# Patient Record
Sex: FEMALE | Race: WHITE | Employment: OTHER | URBAN - METROPOLITAN AREA
[De-identification: names, ages, dates, MRNs, and addresses within clinical notes are randomized per-mention and may not be internally consistent; named-entity substitution may affect disease eponyms.]

---

## 2019-05-31 ENCOUNTER — APPOINTMENT (OUTPATIENT)
Dept: GENERAL RADIOLOGY | Age: 34
End: 2019-05-31
Payer: COMMERCIAL

## 2019-05-31 ENCOUNTER — HOSPITAL ENCOUNTER (EMERGENCY)
Age: 34
Discharge: HOME | End: 2019-05-31
Payer: COMMERCIAL

## 2019-05-31 VITALS
DIASTOLIC BLOOD PRESSURE: 63 MMHG | RESPIRATION RATE: 17 BRPM | HEART RATE: 86 BPM | HEIGHT: 66 IN | WEIGHT: 125 LBS | OXYGEN SATURATION: 99 % | SYSTOLIC BLOOD PRESSURE: 105 MMHG | TEMPERATURE: 97.8 F | BODY MASS INDEX: 20.09 KG/M2

## 2019-05-31 DIAGNOSIS — S52.601A CLOSED FRACTURE OF DISTAL END OF RIGHT ULNA, UNSPECIFIED FRACTURE MORPHOLOGY, INITIAL ENCOUNTER: Primary | ICD-10-CM

## 2019-05-31 PROCEDURE — 73110 X-RAY EXAM OF WRIST: CPT

## 2019-05-31 PROCEDURE — 4500000023 HC ED LEVEL 3 PROCEDURE

## 2019-05-31 PROCEDURE — 6370000000 HC RX 637 (ALT 250 FOR IP): Performed by: NURSE PRACTITIONER

## 2019-05-31 PROCEDURE — 99283 EMERGENCY DEPT VISIT LOW MDM: CPT

## 2019-05-31 RX ORDER — HYDROCODONE BITARTRATE AND ACETAMINOPHEN 5; 325 MG/1; MG/1
1 TABLET ORAL ONCE
Status: DISCONTINUED | OUTPATIENT
Start: 2019-05-31 | End: 2019-05-31

## 2019-05-31 RX ORDER — NAPROXEN 500 MG/1
500 TABLET ORAL 2 TIMES DAILY
Qty: 60 TABLET | Refills: 0 | Status: SHIPPED | OUTPATIENT
Start: 2019-05-31 | End: 2021-06-16

## 2019-05-31 RX ORDER — HYDROCODONE BITARTRATE AND ACETAMINOPHEN 5; 325 MG/1; MG/1
1 TABLET ORAL EVERY 6 HOURS PRN
Qty: 10 TABLET | Refills: 0 | Status: SHIPPED | OUTPATIENT
Start: 2019-05-31 | End: 2019-06-03

## 2019-05-31 RX ORDER — NAPROXEN 250 MG/1
500 TABLET ORAL ONCE
Status: COMPLETED | OUTPATIENT
Start: 2019-05-31 | End: 2019-05-31

## 2019-05-31 RX ADMIN — NAPROXEN 500 MG: 250 TABLET ORAL at 07:55

## 2019-05-31 ASSESSMENT — PAIN DESCRIPTION - ONSET: ONSET: ON-GOING

## 2019-05-31 ASSESSMENT — PAIN DESCRIPTION - FREQUENCY: FREQUENCY: CONTINUOUS

## 2019-05-31 ASSESSMENT — PAIN SCALES - GENERAL
PAINLEVEL_OUTOF10: 9
PAINLEVEL_OUTOF10: 7
PAINLEVEL_OUTOF10: 7

## 2019-05-31 ASSESSMENT — PAIN DESCRIPTION - PAIN TYPE: TYPE: ACUTE PAIN

## 2019-05-31 ASSESSMENT — PAIN DESCRIPTION - ORIENTATION: ORIENTATION: RIGHT

## 2019-05-31 ASSESSMENT — PAIN - FUNCTIONAL ASSESSMENT: PAIN_FUNCTIONAL_ASSESSMENT: ACTIVITIES ARE NOT PREVENTED

## 2019-05-31 ASSESSMENT — PAIN DESCRIPTION - LOCATION: LOCATION: ARM

## 2019-05-31 ASSESSMENT — PAIN DESCRIPTION - PROGRESSION: CLINICAL_PROGRESSION: GRADUALLY WORSENING

## 2019-05-31 ASSESSMENT — PAIN DESCRIPTION - DESCRIPTORS: DESCRIPTORS: THROBBING

## 2019-05-31 NOTE — ED TRIAGE NOTES
pt presents ambulatory to ED for poss broken R arm. she tripped and fell up the stairs right now. tearful in pain. pain 9/10. Pt refused norco, requesting for non-narcotic.  Will inform provider

## 2019-05-31 NOTE — ED PROVIDER NOTES
Tobacco use disorder     Unequal leg length (acquired)     Urinary tract infection, site not specified      Past Surgical History:   Procedure Laterality Date    COLPOSCOPY  2/26/2013,8/2012    hsil and hpv+       CURRENT MEDICATIONS  (may include discharge medications prescribed in the ED)  Current Outpatient Rx   Medication Sig Dispense Refill    naproxen (NAPROSYN) 500 MG tablet Take 1 tablet by mouth 2 times daily 60 tablet 0    HYDROcodone-acetaminophen (NORCO) 5-325 MG per tablet Take 1 tablet by mouth every 6 hours as needed for Pain for up to 3 days. Intended supply: 3 days. Take lowest dose possible to manage pain 10 tablet 0    drospirenone-ethinyl estradiol (OCELLA) 3-0.03 MG TABS Take 1 tablet by mouth daily 28 tablet 11       ALLERGIES    No Known Allergies    SOCIAL & FAMILY HISTORY    Social History     Socioeconomic History    Marital status:      Spouse name: None    Number of children: None    Years of education: None    Highest education level: None   Occupational History    None   Social Needs    Financial resource strain: None    Food insecurity:     Worry: None     Inability: None    Transportation needs:     Medical: None     Non-medical: None   Tobacco Use    Smoking status: Current Every Day Smoker     Packs/day: 0.50     Years: 14.00     Pack years: 7.00    Smokeless tobacco: Never Used    Tobacco comment: encouraged to quit/6/6/13, cessation 1/15/14/cessasion 11/15   Substance and Sexual Activity    Alcohol use:  Yes     Alcohol/week: 0.0 oz     Comment: occasionally    Drug use: No    Sexual activity: Yes     Partners: Male   Lifestyle    Physical activity:     Days per week: None     Minutes per session: None    Stress: None   Relationships    Social connections:     Talks on phone: None     Gets together: None     Attends Worship service: None     Active member of club or organization: None     Attends meetings of clubs or organizations: None placement. ED COURSE & MEDICAL DECISION MAKING   See chart for medications given during emergency department course. Differential diagnosis: includes but not limited to Arterial Injury/Ischemia, Fracture, Dislocation, Infection, Compartment Syndrome, Neurologic Deficit/Injury. No evidence of neurovascular injury on exam.  Plain films as above. The patient was instructed to follow up as an outpatient in 3 days with orthopedics. The patient was instructed to return to the ED immediately for any new or worsening symptoms. The patient verbalized understanding. I have evaluated this patient. My supervising physician was available for consultation. FINAL IMPRESSION    1.  Closed fracture of distal end of right ulna, unspecified fracture morphology, initial encounter        PLAN  Discharge with outpatient follow-up and discharge instructions (see EMR)    (Please note that this note was completed with a voice recognition program.  Every attempt was made to edit the dictations, but inevitably there remain words that are mis-transcribed.)        AMBROSE Tipton - CNP  05/31/19 0398

## 2019-06-03 ENCOUNTER — OFFICE VISIT (OUTPATIENT)
Dept: ORTHOPEDIC SURGERY | Age: 34
End: 2019-06-03
Payer: COMMERCIAL

## 2019-06-03 VITALS
DIASTOLIC BLOOD PRESSURE: 76 MMHG | HEIGHT: 66 IN | WEIGHT: 125 LBS | BODY MASS INDEX: 20.09 KG/M2 | HEART RATE: 91 BPM | TEMPERATURE: 98.4 F | SYSTOLIC BLOOD PRESSURE: 115 MMHG

## 2019-06-03 VITALS — WEIGHT: 125 LBS | BODY MASS INDEX: 20.09 KG/M2 | HEIGHT: 66 IN | RESPIRATION RATE: 16 BRPM

## 2019-06-03 DIAGNOSIS — S52.231A CLOSED DISPLACED OBLIQUE FRACTURE OF SHAFT OF RIGHT ULNA, INITIAL ENCOUNTER: Primary | ICD-10-CM

## 2019-06-03 DIAGNOSIS — S52.201A CLOSED FRACTURE OF SHAFT OF RIGHT ULNA, UNSPECIFIED FRACTURE MORPHOLOGY, INITIAL ENCOUNTER: Primary | ICD-10-CM

## 2019-06-03 PROCEDURE — G8420 CALC BMI NORM PARAMETERS: HCPCS | Performed by: ORTHOPAEDIC SURGERY

## 2019-06-03 PROCEDURE — G8427 DOCREV CUR MEDS BY ELIG CLIN: HCPCS | Performed by: ORTHOPAEDIC SURGERY

## 2019-06-03 PROCEDURE — L3660 SO 8 AB RSTR CAN/WEB PRE OTS: HCPCS | Performed by: PHYSICIAN ASSISTANT

## 2019-06-03 PROCEDURE — 99243 OFF/OP CNSLTJ NEW/EST LOW 30: CPT | Performed by: ORTHOPAEDIC SURGERY

## 2019-06-03 NOTE — LETTER
Left.  FDS, FDP, and Common Extensor tendon function is intact to each digit. FPL & EPL tendon function is intact to the thumb. Wrist range of motion is limited by pain and not able to be evaluated due to the associated injury on the Right, normal on the Left  Sensation is subjectively normal in the Median Innervated Digits and Ulnar Innervated Digits, other digits are normally sensate bilaterally   Vascular examination reveals normal and good capillary refill bilaterally  Swelling is moderate in the wrist & digits on the Right, normal on the Left. Maximal pain is elicited with palpation of the Ulnar aspect of the wrist.  The distal ulna/ulnar styloid is moderately tender to palpation. The radial wrist and forearm are minimally tender  There is not clinical evidence of skeletal deformity or mal-alignment on the Right, normal on the Left. There is no evidence of gross joint instability, excluding the immediate zone of injury, bilaterally. Muscular strength is clinically appropriate bilaterally, limited by pain in the injured extremity. Radiographic Evaluation:  Radiographs are reviewed  today (3 views of the right wrist). They demonstrate evidence of an acute fracture of the ULNAR SHAFT with no comminution, displaced, mild loss of lenght. The distal radius and remaining carpus does not demonstrate evidence of a fracture. There is not evidence of other injury or bony fracture. Impression:  Ms. Murtaza Downey has sustained recent Ulnar Shaft fracture displaced and presents requesting further treatment. Plan:  I have discussed with Ms. Murtaza Downey the various treatment options for treatment of right wrist fracture. We discussed the options of Closed treatment in situ, attempted closed reduction & cast immobilization, and Open Reduction & Internal Fixation of the fracture. I have explained the pre-, radha- ane post-operative considerations to her. She is specifically instructed to contact the office between now & her scheduled appointment if she has concerns related to the cast or the underlying fracture. She is welcome to call for an appointment sooner if she has any additional concerns or questions. If you have questions, please do not hesitate to call me. I look forward to following Selena Navarro along with you.     Sincerely,        Babatunde Booth MD

## 2019-06-03 NOTE — LETTER
[facility]  Surgery Scheduling Form:      DEMOGRAPHICS:                                                                                                              .    Patient Name:  Perico Michele  Patient :  1985   Patient SS#:      Patient Phone:  [phone] (home)     Patient Address:  [place] [id number]    PCP:  TIMOTHY Rueda MD  Insurance:    Payor/Plan Subscr  Sex Relation Sub. Ins. ID Effective Group Num   1. [employer] - * DARIA ANDREWS* 1985 Female Self [id number] 1/1/15 [facility] [address]   DIAGNOSIS & PROCEDURE:                                                                                            .  Diagnosis:   right Ulnar Shaft Fracture   S52.601A  Operation:  Open Reduction & Internal Fixation Right Ulnar Shaft Fracture   [address]  Location:  [facility] [place]  Surgeon:  Betzaida Carrasquillo    SCHEDULING INFORMATION:                                                                                         .  Surgeon's Scheduling Instruction:  within 7 days  Requested Date:    OR Time:  10:10 Patient Arrival Time:  8:45OR Time Required:  50  Minutes  Anesthesia:  General  Equipment:  KENIA Locking Small Fragment Tray, TPS  Mini C-Arm:  Yes   Standard C-Arm:  No  Status:  outpatient  PAT Required:  Yes  Comments:                        Belle White MD  6/3/19     BILLING INFORMATION:                                                                                                    .    Procedure:       CPT Code Modifier

## 2019-06-03 NOTE — LETTER
CONSENT TO OPERATION  AND/OR OTHER PROCEDURE(S)          PATIENT : Bianka Roland   YOB: 1985    DATE : 6/3/19          1. I request and consent that Dr. Eloisa Umana. James Reeves,  and/or his associates or assistants perform an operation and/or procedures on the above patient at  [place] [address], to treat the condition(s) which appear indicated by the diagnostic studies already performed. I have been told that in general terms the nature, purpose and reasonable expectations of the operation and/or procedure(s) are:      Open Reduction & Internal Fixation Right Ulnar Shaft Fracture      2. It has been explained to me by the informing physician that during the course of the operation and/or procedure(s) unforeseen conditions may be revealed that necessitate an extension of the original operation and/or procedure(s) or different operation and/or procedures than those set forth in Paragraph 1. I therefore authorize and request that my physician and/or his associates or assistants perform such operations and/or procedures as are necessary and desirable in the exercise of professional judgment. The authority granted under this Paragraph 2 shall extend to all conditions that require treatment and are known to my physician at the time the operation is commenced. 3. I have been made aware by the informing physician of certain risks and consequences that are associated with the operation and/or procedure(s) described in Paragraph 1, the reasonable alternative methods or treatment, the possible consequences, the possibility that the operation and/or procedure(s) may be unsuccessful and the possibility of complications. I understand the reasonably known risks to be:      ? Bleeding  ? Infection  ? Poor Healing  ? Possible Damage to Nerve, Vessel, Tendon/Muscle or Bone  ? Need for further Treatment/Surgery  ? Stiffness  ? Pain  ?  Residual or Recurrent Symptoms ? Anesthetic and/or Medical Risks                    4. I have also been informed by the informing physician that there are other risks from both known and unknown causes that are attendant to the performance of any surgical procedure. I am aware that the practice of medicine and surgery is not an exact science, and that no guarantees have been made to me concerning the results of the operation and/or procedure(s). 5. I   CONSENT / REFUSE CONSENT  (strike the phrase that does not apply) to the taking of photographs before, during and/or after the operation or procedure for scientific/educational purposes. 6. I consent to the administration of anesthesia and to the use of such anesthetics as may be deemed advisable by the anesthesiologist who has been engaged by me or my physician. 7. I certify that I have read and understand the above consent to operation and/or other procedure(s); that the explanations therein referred to were made to me by the informing physician in advance of my signing this consent; that all blanks or statements requiring insertion or completion were filled in and inapplicable paragraphs, if any, were stricken before I signed; and that all questions asked by me about the operation and/or procedure(s) which I have consented to have been fully answered in a satisfactory manner.                               _______________________           6/3/19                            Witness     Signature Of Patient         Date        Pierre Sahni                                                 Informing Physician                                           Signature of Informing Physician                              If patient is unable to sign or is a minor, complete one of the following:    (A)  Patient is a minor  years of age. (B)  Patient is unable to sign because:           The undersigned represents that he or she is duly authorized to execute

## 2019-06-03 NOTE — PROGRESS NOTES
Rashes/Skin Problems Neg Hx     Seizures Neg Hx     Stroke Neg Hx     Thyroid Disease Neg Hx        Past Surgical History:   Procedure Laterality Date    COLPOSCOPY  2/26/2013,8/2012    hsil and hpv+       Social History     Occupational History    Not on file   Tobacco Use    Smoking status: Current Every Day Smoker     Packs/day: 0.50     Years: 14.00     Pack years: 7.00    Smokeless tobacco: Never Used    Tobacco comment: encouraged to quit/6/6/13, cessation 1/15/14/cessasion 11/15   Substance and Sexual Activity    Alcohol use: Yes     Alcohol/week: 0.0 oz     Comment: occasionally    Drug use: No    Sexual activity: Yes     Partners: Male       Current Outpatient Medications   Medication Sig Dispense Refill    naproxen (NAPROSYN) 500 MG tablet Take 1 tablet by mouth 2 times daily 60 tablet 0    HYDROcodone-acetaminophen (NORCO) 5-325 MG per tablet Take 1 tablet by mouth every 6 hours as needed for Pain for up to 3 days. Intended supply: 3 days. Take lowest dose possible to manage pain 10 tablet 0    drospirenone-ethinyl estradiol (OCELLA) 3-0.03 MG TABS Take 1 tablet by mouth daily 28 tablet 11     No current facility-administered medications for this visit. Objective:     She is alert, oriented x 3, pleasant, well nourished, developed and in no   acute distress. /76   Pulse 91   Temp 98.4 °F (36.9 °C) (Temporal)   Ht 5' 6\" (1.676 m)   Wt 125 lb (56.7 kg)   LMP 05/24/2019   BMI 20.18 kg/m²      Her right upper extremity is in a volar splint. This was removed. Skin was inspected and appeared to be intact without abrasions or lacerations. She has tenderness over the distal ulna. Examination of the upper extremities are intact with sensation to light touch. Motor testing  5/5 in all major motor groups including hand intrinsics. Radial, Median and Ulnar nerves are intact. Yang's Sign absent.       Examination of the upper extremities shows intact perfusion to all extremities. No cyanosis. Digits are warm to touch. Capillary refill is less than 2 seconds. There is no edema noted. Examination of the skin over the upper extremities:  Reveals the skin to be intact without lacerations or abrasions. There are no significant erythema, rashes or skin lesions. X Rays: not performed in the office today:   [facility] outside facility:  Narrative   EXAMINATION:   3 XRAY VIEWS OF THE RIGHT WRIST       5/31/2019 7:49 am       COMPARISON:   None.       HISTORY:   ORDERING SYSTEM PROVIDED HISTORY: injury   TECHNOLOGIST PROVIDED HISTORY:   Reason for exam:->injury   Ordering Physician Provided Reason for Exam: fell on steps last night   Acuity: Acute   Type of Exam: Initial   Relevant Medical/Surgical History: pain       FINDINGS:   There is an oblique displaced fracture of the distal ulnar shaft.  No   fracture elsewhere and no dislocation           Impression   Fracture of the distal ulnar shaft, with displacement.               Additional Tests reviewed: None  Additional Outside Records reviewed: None    Diagnosis:       ICD-10-CM    1. Closed fracture of shaft of right ulna, unspecified fracture morphology, initial encounter S52.201A Breg Slingshot 2 Shoulder Sling        Assessment and Plan:     She has a closed fracture of the right distal ulnar shaft. The natural history of the patient's diagnosis as well as the treatment options were discussed in full and questions were answered. Risks and benefits of the treatment options also reviewed in detail. Was placed back into the volar splint for immobilization. She was provided a sling for comfort. Instructed in elevation of the hand for swelling of the digits. Procedures    Breg Slingshot 2 Shoulder Sling     Patient was prescribed a Breg Slingshot 2 Shoulder Sling. The right shoulder will require stabilization / immobilization from this orthosis.   The orthosis will assist in protecting

## 2019-06-03 NOTE — Clinical Note
Dear  TIMOTHY Nova MD,Thank you very much for your referral or Ms. Rubina Church to me for evaluation and treatment of her Hand & Wrist condition. I appreciate your confidence in me and thank you for allowing me the opportunity to care for your patients. If I can be of any further assistance to you on this or any other patient, please do not hesitate to contact me. Sincerely,Young Plascencia MD

## 2019-06-04 RX ORDER — VALACYCLOVIR HYDROCHLORIDE 500 MG/1
500 TABLET, FILM COATED ORAL DAILY
COMMUNITY
Start: 2019-01-31 | End: 2021-08-12 | Stop reason: SDUPTHER

## 2019-06-04 RX ORDER — NAPROXEN SODIUM 220 MG
220 TABLET ORAL PRN
COMMUNITY
End: 2021-06-16

## 2019-06-04 NOTE — PATIENT INSTRUCTIONS
Pre-Operative Instructions    1. The night before your surgery, unless otherwise instructed, do not eat any food, drink any liquids, chew gum or mints after midnight. Abstain from alcohol for 24 hours prior to surgery. 2. You will be contacted by the Hospital the working day prior to your procedure to confirm your arrival time. 3. Patients under 25years of age must have a parent or legal guardian present to sign their consent and discharge paperwork. 4. On the day of surgery,  you will be seen pre-operatively by an anesthesiologist.     5. If you are having hand surgery, it is recommended that nail polish and acrylic nails be removed prior to surgery if possible. 6. Please bring cases for glasses, contact lenses, hearing aids or dentures. They will likely be removed prior to surgery. 7. Wear casual, loose-fitting and comfortable clothing. Consider that you may have a large dressing to fit under your clothing after surgery. 9. Please do not bring valuables such as jewelry or large sums of cash to the hospital. Remove all body piercings before coming to the hospital. [place] may not  wear any rings on the hand if you are having surgery on that hand, wrist or elbow. 10. Do not smoke or chew tobacco before your surgery. [address] [place] [address] and surgery facilities are smoke-free environments. Smoking is not permitted anywhere on campus. 11. Be sure to follow any additional instructions from your physician. If the above conditions are not met, your surgery may be cancelled and rescheduled for another day. Should you develop any change in your health such as fever, cough, sore throat, cold, flu, or infection, or if you have any questions regarding your Pre-admission or surgery, please contact [address] - Surgery Scheduling at [phone], Monday through Friday, 9 a.m. to 5 p.m.

## 2019-06-04 NOTE — PROGRESS NOTES
[facility] time______0845______        Surgery time____1010________    Take the following medications with a sip of water: Follow your MD/Surgeons pre-procedure instructions regarding your medications    Do not eat or drink anything after 12:00 midnight prior to your surgery. This includes water chewing gum, mints and ice chips. You may brush your teeth and gargle the morning of your surgery, but do not swallow the water     Please see your family doctor/pediatrician for a history and physical and/or concerning medications. Bring any test results/reports from your physicians office. If you are under the care of a heart doctor or specialist doctor, please be aware that you may be asked to them for clearance    You may be asked to stop blood thinners such as Coumadin, Plavix, Fragmin, Lovenox, etc., or any anti-inflammatories such as:  Aspirin, Ibuprofen, Advil, Naproxen prior to your surgery. We also ask that you stop any OTC medications such as fish oil, vitamin E, glucosamine, garlic, Multivitamins, COQ 10, etc.    We ask that you do not smoke 24 hours prior to surgery  We ask that you do not  drink any alcoholic beverages 24 hours prior to surgery     You must make arrangements for a responsible adult to take you home after your surgery. For your safety you will not be allowed to leave alone or drive yourself home. Your surgery will be cancelled if you do not have a ride home. Also for your safety, it is strongly suggested that someone stay with you the first 24 hours after your surgery. A parent or legal guardian must accompany a child scheduled for surgery and plan to stay at the hospital until the child is discharged. Please do not bring other children with you. For your comfort, please wear simple loose fitting clothing to the hospital.  Please do not bring valuables.     Do not wear any make-up or nail polish on your fingers or

## 2019-06-04 NOTE — PROGRESS NOTES
Ms. Cassandra Lockwood is a 27 y.o. right handed woman  who is seen today in Hand Surgical Consultation at the request of TIMOTHY Borja MD.    She is seen today regarding an injury occurring on May 30th, 2019. She reports injuring her right wrist, having Fallen on the stairs at Home. She was seen for Emergency evaluation elsewhere where radiographs were obtained & she was immobilized. By report, there  was not an associated skin injury. She reports moderate pain located in the  Ulnar aspect of the distal forearm & wrist, no tenderness throughout the hand or elbow. She notes today, no neurologic symptoms in the Whole Hand. Symptoms show no change over time. The patient's , past medical history, medications, allergies,  family history, social history, and review of systems have been updated, reviewed and have been scanned into the chart today. Physical Exam:  Ms. Cassandra Lockwood most recent vitals:  Vitals  Resp: 16  Height: 5' 6\" (167.6 cm)  Weight: 125 lb (56.7 kg)    She is well nourished, oriented to person, place & time. She demonstrates appropriate mood and affect as well as normal gait and station. Skin: Normal in appearance, Normal Color and Free of Lesions in the injured limb, normal on the contralateral side  Digital range of motion is limited by pain on the Right, normal on the Left. FDS, FDP, and Common Extensor tendon function is intact to each digit. FPL & EPL tendon function is intact to the thumb. Wrist range of motion is limited by pain and not able to be evaluated due to the associated injury on the Right, normal on the Left  Sensation is subjectively normal in the Median Innervated Digits and Ulnar Innervated Digits, other digits are normally sensate bilaterally   Vascular examination reveals normal and good capillary refill bilaterally  Swelling is moderate in the wrist & digits on the Right, normal on the Left.   Maximal pain is elicited with palpation of the Ulnar aspect of the wrist.  The distal ulna/ulnar styloid is moderately tender to palpation. The radial wrist and forearm are minimally tender  There is not clinical evidence of skeletal deformity or mal-alignment on the Right, normal on the Left. There is no evidence of gross joint instability, excluding the immediate zone of injury, bilaterally. Muscular strength is clinically appropriate bilaterally, limited by pain in the injured extremity. Radiographic Evaluation:  Radiographs are reviewed  today (3 views of the right wrist). They demonstrate evidence of an acute fracture of the ULNAR SHAFT with no comminution, displaced, mild loss of lenght. The distal radius and remaining carpus does not demonstrate evidence of a fracture. There is not evidence of other injury or bony fracture. Impression:  Ms. Sadaf Contreras has sustained recent Ulnar Shaft fracture displaced and presents requesting further treatment. Plan:  I have discussed with Ms. Sadaf Contreras the various treatment options for treatment of right wrist fracture. We discussed the options of Closed treatment in situ, attempted closed reduction & cast immobilization, and Open Reduction & Internal Fixation of the fracture. I have explained the pre-, radha- ane post-operative considerations to her.  she has elected to proceed with Open Reduction & Internal Fixation of her fracture. She has voiced an understanding of the other options available to her. I have explained the complications, limitations, expectations, alternatives, & risks of her chosen treatment. We discussed the possibility of residual symptoms as may be related to surgical treatment of fractures including the possiblities of: mal-union, non-union, delayed union, persistant deformity, persistant pain, limitation of motion, future arthritic symptoms & the possible need for further treatment.   We also specifically discussed risks related to any surgical procedure including: bleeding, infection, scar formation, & possible need for repeat operation. She understood our discussion and was comfortable with her decision; she was provided with appropriate expectations. I had an extensive discussion with Ms. Nano Bravo  and any family members present regarding the natural history, etiology, and long term consequences of this problem. I have outlined a treatment plan with them and, in my opinion, surgical intervention is indicated at this time. I have discussed with them the potential complications, limitations, expectations, alternatives, and risks of the procedure. She has had full opportunity to ask their questions. I have answered them all to her satisfaction. I feel that the patient and any present family members do understand our discussion today and she has provided informed consent for Open Reduction & Internal Fixation of her  right Ulnar Shaft Fracture. I have counseled Ms. Nano Bravo on the impact of her smoking habit on fracture healing, particularly related to the scaphoid. She is avised to quit smoking and seek any necessary assistance from her Primary Care Physician. She is appropriately immobilized and protected until the time of the scheduled surgery. She is specifically instructed to contact the office between now & her scheduled appointment if she has concerns related to the cast or the underlying fracture. She is welcome to call for an appointment sooner if she has any additional concerns or questions.

## 2019-06-04 NOTE — PROGRESS NOTES
C-Difficile admission screening and protocol:     * Admitted with diarrhea? YES____    NO__X___     *Prior history of C-Diff. In last 3 months? YES____   NO_X___     *Antibiotic use in the past 6-8 weeks? NO___X___YES______                 If yes which  ANTIBIOTIC AND REASON______     *Prior hospitalization or nursing home in the last month?  YES____   NO__X__

## 2019-06-05 ENCOUNTER — ANESTHESIA EVENT (OUTPATIENT)
Dept: OPERATING ROOM | Age: 34
End: 2019-06-05
Payer: COMMERCIAL

## 2019-06-05 ENCOUNTER — TELEPHONE (OUTPATIENT)
Dept: ORTHOPEDIC SURGERY | Age: 34
End: 2019-06-05

## 2019-06-05 NOTE — COMMUNICATION BODY
Ms. Wali Serrano is a 27 y.o. right handed woman  who is seen today in Hand Surgical Consultation at the request of TIMOTHY Colon Call, MD.    She is seen today regarding an injury occurring on May 30th, 2019. She reports injuring her right wrist, having Fallen on the stairs at Home. She was seen for Emergency evaluation elsewhere where radiographs were obtained & she was immobilized. By report, there  was not an associated skin injury. She reports moderate pain located in the  Ulnar aspect of the distal forearm & wrist, no tenderness throughout the hand or elbow. She notes today, no neurologic symptoms in the Whole Hand. Symptoms show no change over time. The patient's , past medical history, medications, allergies,  family history, social history, and review of systems have been updated, reviewed and have been scanned into the chart today. Physical Exam:  Ms. Wali Serrano most recent vitals:  Vitals  Resp: 16  Height: 5' 6\" (167.6 cm)  Weight: 125 lb (56.7 kg)    She is well nourished, oriented to person, place & time. She demonstrates appropriate mood and affect as well as normal gait and station. Skin: Normal in appearance, Normal Color and Free of Lesions in the injured limb, normal on the contralateral side  Digital range of motion is limited by pain on the Right, normal on the Left. FDS, FDP, and Common Extensor tendon function is intact to each digit. FPL & EPL tendon function is intact to the thumb. Wrist range of motion is limited by pain and not able to be evaluated due to the associated injury on the Right, normal on the Left  Sensation is subjectively normal in the Median Innervated Digits and Ulnar Innervated Digits, other digits are normally sensate bilaterally   Vascular examination reveals normal and good capillary refill bilaterally  Swelling is moderate in the wrist & digits on the Right, normal on the Left.   Maximal pain is elicited with palpation of the Ulnar aspect of the wrist.  The distal ulna/ulnar styloid is moderately tender to palpation. The radial wrist and forearm are minimally tender  There is not clinical evidence of skeletal deformity or mal-alignment on the Right, normal on the Left. There is no evidence of gross joint instability, excluding the immediate zone of injury, bilaterally. Muscular strength is clinically appropriate bilaterally, limited by pain in the injured extremity. Radiographic Evaluation:  Radiographs are reviewed  today (3 views of the right wrist). They demonstrate evidence of an acute fracture of the ULNAR SHAFT with no comminution, displaced, mild loss of lenght. The distal radius and remaining carpus does not demonstrate evidence of a fracture. There is not evidence of other injury or bony fracture. Impression:  Ms. Sadaf Contreras has sustained recent Ulnar Shaft fracture displaced and presents requesting further treatment. Plan:  I have discussed with Ms. Sadaf Contreras the various treatment options for treatment of right wrist fracture. We discussed the options of Closed treatment in situ, attempted closed reduction & cast immobilization, and Open Reduction & Internal Fixation of the fracture. I have explained the pre-, radha- ane post-operative considerations to her.  she has elected to proceed with Open Reduction & Internal Fixation of her fracture. She has voiced an understanding of the other options available to her. I have explained the complications, limitations, expectations, alternatives, & risks of her chosen treatment. We discussed the possibility of residual symptoms as may be related to surgical treatment of fractures including the possiblities of: mal-union, non-union, delayed union, persistant deformity, persistant pain, limitation of motion, future arthritic symptoms & the possible need for further treatment.   We also specifically discussed risks related to any surgical procedure including: bleeding, infection, scar formation, & possible need for repeat operation. She understood our discussion and was comfortable with her decision; she was provided with appropriate expectations. I had an extensive discussion with Ms. Crispin Brown  and any family members present regarding the natural history, etiology, and long term consequences of this problem. I have outlined a treatment plan with them and, in my opinion, surgical intervention is indicated at this time. I have discussed with them the potential complications, limitations, expectations, alternatives, and risks of the procedure. She has had full opportunity to ask their questions. I have answered them all to her satisfaction. I feel that the patient and any present family members do understand our discussion today and she has provided informed consent for Open Reduction & Internal Fixation of her  right Ulnar Shaft Fracture. I have counseled Ms. Crispin Brown on the impact of her smoking habit on fracture healing, particularly related to the scaphoid. She is avised to quit smoking and seek any necessary assistance from her Primary Care Physician. She is appropriately immobilized and protected until the time of the scheduled surgery. She is specifically instructed to contact the office between now & her scheduled appointment if she has concerns related to the cast or the underlying fracture. She is welcome to call for an appointment sooner if she has any additional concerns or questions.

## 2019-06-06 ENCOUNTER — HOSPITAL ENCOUNTER (OUTPATIENT)
Age: 34
Setting detail: OUTPATIENT SURGERY
Discharge: HOME | End: 2019-06-06
Attending: ORTHOPAEDIC SURGERY | Admitting: ORTHOPAEDIC SURGERY
Payer: COMMERCIAL

## 2019-06-06 ENCOUNTER — HOSPITAL ENCOUNTER (OUTPATIENT)
Dept: GENERAL RADIOLOGY | Age: 34
Discharge: HOME | End: 2019-06-06
Payer: COMMERCIAL

## 2019-06-06 ENCOUNTER — ANESTHESIA (OUTPATIENT)
Dept: OPERATING ROOM | Age: 34
End: 2019-06-06
Payer: COMMERCIAL

## 2019-06-06 VITALS
OXYGEN SATURATION: 99 % | HEART RATE: 80 BPM | SYSTOLIC BLOOD PRESSURE: 110 MMHG | DIASTOLIC BLOOD PRESSURE: 60 MMHG | BODY MASS INDEX: 18.9 KG/M2 | RESPIRATION RATE: 14 BRPM | HEIGHT: 66 IN | WEIGHT: 117.62 LBS | TEMPERATURE: 97 F

## 2019-06-06 VITALS
TEMPERATURE: 97.7 F | RESPIRATION RATE: 1 BRPM | OXYGEN SATURATION: 100 % | DIASTOLIC BLOOD PRESSURE: 48 MMHG | SYSTOLIC BLOOD PRESSURE: 79 MMHG

## 2019-06-06 DIAGNOSIS — S52.201A CLOSED FRACTURE OF SHAFT OF RIGHT ULNA, UNSPECIFIED FRACTURE MORPHOLOGY, INITIAL ENCOUNTER: Primary | ICD-10-CM

## 2019-06-06 LAB — PREGNANCY, URINE: NEGATIVE

## 2019-06-06 PROCEDURE — 2709999900 HC NON-CHARGEABLE SUPPLY: Performed by: ORTHOPAEDIC SURGERY

## 2019-06-06 PROCEDURE — 3209999900 FLUORO FOR SURGICAL PROCEDURES

## 2019-06-06 PROCEDURE — 2500000003 HC RX 250 WO HCPCS: Performed by: ORTHOPAEDIC SURGERY

## 2019-06-06 PROCEDURE — 2500000003 HC RX 250 WO HCPCS: Performed by: NURSE ANESTHETIST, CERTIFIED REGISTERED

## 2019-06-06 PROCEDURE — 7100000000 HC PACU RECOVERY - FIRST 15 MIN: Performed by: ORTHOPAEDIC SURGERY

## 2019-06-06 PROCEDURE — 2580000003 HC RX 258: Performed by: NURSE ANESTHETIST, CERTIFIED REGISTERED

## 2019-06-06 PROCEDURE — C1713 ANCHOR/SCREW BN/BN,TIS/BN: HCPCS | Performed by: ORTHOPAEDIC SURGERY

## 2019-06-06 PROCEDURE — 3700000000 HC ANESTHESIA ATTENDED CARE: Performed by: ORTHOPAEDIC SURGERY

## 2019-06-06 PROCEDURE — 7100000011 HC PHASE II RECOVERY - ADDTL 15 MIN: Performed by: ORTHOPAEDIC SURGERY

## 2019-06-06 PROCEDURE — 6370000000 HC RX 637 (ALT 250 FOR IP): Performed by: ANESTHESIOLOGY

## 2019-06-06 PROCEDURE — 3600000005 HC SURGERY LEVEL 5 BASE: Performed by: ORTHOPAEDIC SURGERY

## 2019-06-06 PROCEDURE — 7100000001 HC PACU RECOVERY - ADDTL 15 MIN: Performed by: ORTHOPAEDIC SURGERY

## 2019-06-06 PROCEDURE — 3700000001 HC ADD 15 MINUTES (ANESTHESIA): Performed by: ORTHOPAEDIC SURGERY

## 2019-06-06 PROCEDURE — 3600000015 HC SURGERY LEVEL 5 ADDTL 15MIN: Performed by: ORTHOPAEDIC SURGERY

## 2019-06-06 PROCEDURE — 84703 CHORIONIC GONADOTROPIN ASSAY: CPT

## 2019-06-06 PROCEDURE — 7100000010 HC PHASE II RECOVERY - FIRST 15 MIN: Performed by: ORTHOPAEDIC SURGERY

## 2019-06-06 PROCEDURE — 6360000002 HC RX W HCPCS: Performed by: NURSE ANESTHETIST, CERTIFIED REGISTERED

## 2019-06-06 PROCEDURE — 2720000010 HC SURG SUPPLY STERILE: Performed by: ORTHOPAEDIC SURGERY

## 2019-06-06 PROCEDURE — 2580000003 HC RX 258: Performed by: ANESTHESIOLOGY

## 2019-06-06 DEVICE — SCREW BNE L16MM DIA3.5MM PERIARTC PROX HUM LOK FULL THRD: Type: IMPLANTABLE DEVICE | Site: ARM | Status: FUNCTIONAL

## 2019-06-06 DEVICE — IMPLANTABLE DEVICE: Type: IMPLANTABLE DEVICE | Site: ARM | Status: FUNCTIONAL

## 2019-06-06 DEVICE — SCREW BNE L14MM DIA3.5MM LNG CORT S STL ST NONCANNULATED: Type: IMPLANTABLE DEVICE | Site: ARM | Status: FUNCTIONAL

## 2019-06-06 DEVICE — SCREW BNE L16MM DIA3.5MM STD CORT S STL ST NONCANNULATED: Type: IMPLANTABLE DEVICE | Site: ARM | Status: FUNCTIONAL

## 2019-06-06 DEVICE — SCREW BNE L18MM DIA3.5MM PERIARTC PROX HUM LOK FULL THRD: Type: IMPLANTABLE DEVICE | Site: ARM | Status: FUNCTIONAL

## 2019-06-06 RX ORDER — FENTANYL CITRATE 50 UG/ML
50 INJECTION, SOLUTION INTRAMUSCULAR; INTRAVENOUS EVERY 5 MIN PRN
Status: DISCONTINUED | OUTPATIENT
Start: 2019-06-06 | End: 2019-06-06 | Stop reason: HOSPADM

## 2019-06-06 RX ORDER — ONDANSETRON 2 MG/ML
4 INJECTION INTRAMUSCULAR; INTRAVENOUS
Status: DISCONTINUED | OUTPATIENT
Start: 2019-06-06 | End: 2019-06-06 | Stop reason: HOSPADM

## 2019-06-06 RX ORDER — HYDROCODONE BITARTRATE AND ACETAMINOPHEN 5; 325 MG/1; MG/1
1 TABLET ORAL EVERY 6 HOURS PRN
Status: ON HOLD | COMMUNITY
End: 2019-06-06 | Stop reason: HOSPADM

## 2019-06-06 RX ORDER — FENTANYL CITRATE 50 UG/ML
INJECTION, SOLUTION INTRAMUSCULAR; INTRAVENOUS PRN
Status: DISCONTINUED | OUTPATIENT
Start: 2019-06-06 | End: 2019-06-06 | Stop reason: SDUPTHER

## 2019-06-06 RX ORDER — MIDAZOLAM HYDROCHLORIDE 1 MG/ML
INJECTION INTRAMUSCULAR; INTRAVENOUS PRN
Status: DISCONTINUED | OUTPATIENT
Start: 2019-06-06 | End: 2019-06-06 | Stop reason: SDUPTHER

## 2019-06-06 RX ORDER — SODIUM CHLORIDE 0.9 % (FLUSH) 0.9 %
10 SYRINGE (ML) INJECTION PRN
Status: DISCONTINUED | OUTPATIENT
Start: 2019-06-06 | End: 2019-06-06 | Stop reason: HOSPADM

## 2019-06-06 RX ORDER — BUPIVACAINE HYDROCHLORIDE 5 MG/ML
INJECTION, SOLUTION EPIDURAL; INTRACAUDAL
Status: COMPLETED | OUTPATIENT
Start: 2019-06-06 | End: 2019-06-06

## 2019-06-06 RX ORDER — GLYCOPYRROLATE 0.2 MG/ML
INJECTION INTRAMUSCULAR; INTRAVENOUS PRN
Status: DISCONTINUED | OUTPATIENT
Start: 2019-06-06 | End: 2019-06-06 | Stop reason: SDUPTHER

## 2019-06-06 RX ORDER — DEXAMETHASONE SODIUM PHOSPHATE 4 MG/ML
INJECTION, SOLUTION INTRA-ARTICULAR; INTRALESIONAL; INTRAMUSCULAR; INTRAVENOUS; SOFT TISSUE PRN
Status: DISCONTINUED | OUTPATIENT
Start: 2019-06-06 | End: 2019-06-06 | Stop reason: SDUPTHER

## 2019-06-06 RX ORDER — HYDROCODONE BITARTRATE AND ACETAMINOPHEN 5; 325 MG/1; MG/1
1 TABLET ORAL EVERY 6 HOURS PRN
Qty: 28 TABLET | Refills: 0 | Status: SHIPPED | OUTPATIENT
Start: 2019-06-06 | End: 2019-06-13

## 2019-06-06 RX ORDER — SODIUM CHLORIDE 9 MG/ML
INJECTION, SOLUTION INTRAVENOUS CONTINUOUS
Status: DISCONTINUED | OUTPATIENT
Start: 2019-06-06 | End: 2019-06-06 | Stop reason: HOSPADM

## 2019-06-06 RX ORDER — IBUPROFEN 400 MG/1
800 TABLET ORAL ONCE
Status: COMPLETED | OUTPATIENT
Start: 2019-06-06 | End: 2019-06-06

## 2019-06-06 RX ORDER — FENTANYL CITRATE 50 UG/ML
25 INJECTION, SOLUTION INTRAMUSCULAR; INTRAVENOUS EVERY 5 MIN PRN
Status: DISCONTINUED | OUTPATIENT
Start: 2019-06-06 | End: 2019-06-06 | Stop reason: HOSPADM

## 2019-06-06 RX ORDER — SODIUM CHLORIDE, SODIUM LACTATE, POTASSIUM CHLORIDE, CALCIUM CHLORIDE 600; 310; 30; 20 MG/100ML; MG/100ML; MG/100ML; MG/100ML
INJECTION, SOLUTION INTRAVENOUS CONTINUOUS PRN
Status: DISCONTINUED | OUTPATIENT
Start: 2019-06-06 | End: 2019-06-06 | Stop reason: SDUPTHER

## 2019-06-06 RX ORDER — SODIUM CHLORIDE 0.9 % (FLUSH) 0.9 %
10 SYRINGE (ML) INJECTION EVERY 12 HOURS SCHEDULED
Status: DISCONTINUED | OUTPATIENT
Start: 2019-06-06 | End: 2019-06-06 | Stop reason: HOSPADM

## 2019-06-06 RX ORDER — PROPOFOL 10 MG/ML
INJECTION, EMULSION INTRAVENOUS PRN
Status: DISCONTINUED | OUTPATIENT
Start: 2019-06-06 | End: 2019-06-06 | Stop reason: SDUPTHER

## 2019-06-06 RX ORDER — ONDANSETRON 2 MG/ML
INJECTION INTRAMUSCULAR; INTRAVENOUS PRN
Status: DISCONTINUED | OUTPATIENT
Start: 2019-06-06 | End: 2019-06-06 | Stop reason: SDUPTHER

## 2019-06-06 RX ORDER — LIDOCAINE HYDROCHLORIDE 20 MG/ML
INJECTION, SOLUTION EPIDURAL; INFILTRATION; INTRACAUDAL; PERINEURAL PRN
Status: DISCONTINUED | OUTPATIENT
Start: 2019-06-06 | End: 2019-06-06 | Stop reason: SDUPTHER

## 2019-06-06 RX ADMIN — DEXAMETHASONE SODIUM PHOSPHATE 4 MG: 4 INJECTION, SOLUTION INTRAMUSCULAR; INTRAVENOUS at 10:35

## 2019-06-06 RX ADMIN — MIDAZOLAM 2 MG: 1 INJECTION INTRAMUSCULAR; INTRAVENOUS at 10:26

## 2019-06-06 RX ADMIN — GLYCOPYRROLATE 0.2 MG: 0.2 INJECTION, SOLUTION INTRAMUSCULAR; INTRAVENOUS at 10:58

## 2019-06-06 RX ADMIN — FENTANYL CITRATE 50 MCG: 50 INJECTION INTRAMUSCULAR; INTRAVENOUS at 10:29

## 2019-06-06 RX ADMIN — SODIUM CHLORIDE, SODIUM LACTATE, POTASSIUM CHLORIDE, AND CALCIUM CHLORIDE: .6; .31; .03; .02 INJECTION, SOLUTION INTRAVENOUS at 10:59

## 2019-06-06 RX ADMIN — FENTANYL CITRATE 50 MCG: 50 INJECTION INTRAMUSCULAR; INTRAVENOUS at 10:39

## 2019-06-06 RX ADMIN — ONDANSETRON 4 MG: 2 INJECTION INTRAMUSCULAR; INTRAVENOUS at 10:35

## 2019-06-06 RX ADMIN — IBUPROFEN 800 MG: 400 TABLET ORAL at 12:40

## 2019-06-06 RX ADMIN — PROPOFOL 200 MG: 10 INJECTION, EMULSION INTRAVENOUS at 10:30

## 2019-06-06 RX ADMIN — LIDOCAINE HYDROCHLORIDE 60 MG: 20 INJECTION, SOLUTION EPIDURAL; INFILTRATION; INTRACAUDAL; PERINEURAL at 10:30

## 2019-06-06 RX ADMIN — SODIUM CHLORIDE: 9 INJECTION, SOLUTION INTRAVENOUS at 09:08

## 2019-06-06 ASSESSMENT — PAIN DESCRIPTION - DESCRIPTORS
DESCRIPTORS: ACHING

## 2019-06-06 ASSESSMENT — PAIN DESCRIPTION - FREQUENCY
FREQUENCY: CONTINUOUS

## 2019-06-06 ASSESSMENT — PULMONARY FUNCTION TESTS
PIF_VALUE: 9
PIF_VALUE: 8
PIF_VALUE: 8
PIF_VALUE: 9
PIF_VALUE: 8
PIF_VALUE: 9
PIF_VALUE: 9
PIF_VALUE: 8
PIF_VALUE: 3
PIF_VALUE: 8
PIF_VALUE: 9
PIF_VALUE: 3
PIF_VALUE: 8
PIF_VALUE: 9
PIF_VALUE: 3
PIF_VALUE: 1
PIF_VALUE: 8
PIF_VALUE: 1
PIF_VALUE: 9
PIF_VALUE: 1
PIF_VALUE: 8
PIF_VALUE: 9
PIF_VALUE: 3
PIF_VALUE: 9
PIF_VALUE: 9
PIF_VALUE: 2
PIF_VALUE: 3
PIF_VALUE: 9
PIF_VALUE: 9
PIF_VALUE: 3
PIF_VALUE: 9
PIF_VALUE: 3
PIF_VALUE: 9
PIF_VALUE: 8
PIF_VALUE: 9
PIF_VALUE: 3
PIF_VALUE: 3
PIF_VALUE: 0
PIF_VALUE: 9
PIF_VALUE: 2
PIF_VALUE: 9
PIF_VALUE: 9
PIF_VALUE: 1
PIF_VALUE: 9
PIF_VALUE: 0

## 2019-06-06 ASSESSMENT — PAIN DESCRIPTION - ORIENTATION
ORIENTATION: RIGHT
ORIENTATION: RIGHT;LOWER

## 2019-06-06 ASSESSMENT — PAIN SCALES - GENERAL
PAINLEVEL_OUTOF10: 2
PAINLEVEL_OUTOF10: 4

## 2019-06-06 ASSESSMENT — PAIN - FUNCTIONAL ASSESSMENT
PAIN_FUNCTIONAL_ASSESSMENT: PREVENTS OR INTERFERES SOME ACTIVE ACTIVITIES AND ADLS
PAIN_FUNCTIONAL_ASSESSMENT: PREVENTS OR INTERFERES SOME ACTIVE ACTIVITIES AND ADLS
PAIN_FUNCTIONAL_ASSESSMENT: 0-10
PAIN_FUNCTIONAL_ASSESSMENT: PREVENTS OR INTERFERES SOME ACTIVE ACTIVITIES AND ADLS

## 2019-06-06 ASSESSMENT — PAIN DESCRIPTION - LOCATION
LOCATION: ARM

## 2019-06-06 ASSESSMENT — PAIN DESCRIPTION - PROGRESSION
CLINICAL_PROGRESSION: NOT CHANGED

## 2019-06-06 ASSESSMENT — PAIN DESCRIPTION - PAIN TYPE
TYPE: SURGICAL PAIN

## 2019-06-06 ASSESSMENT — PAIN DESCRIPTION - ONSET
ONSET: ON-GOING

## 2019-06-06 ASSESSMENT — LIFESTYLE VARIABLES: SMOKING_STATUS: 1

## 2019-06-06 NOTE — ANESTHESIA POSTPROCEDURE EVALUATION
Department of Anesthesiology  Postprocedure Note    Patient: Kiersten Banks  MRN: [id number]  YOB: 1985  Date of evaluation: 6/6/2019  Time:  2:44 PM     Procedure Summary     Date:  06/06/19 Room / Location:  [facility] OR 03 / [facility] OR    Anesthesia Start:  1028 Anesthesia Stop:  1126    Procedure:  OPEN REDUCTION INTERNAL FIXATION RIGHT ULNAR SHAFT FRACTURE WITH MINI C-ARM (Right ) Diagnosis:  (RIGHT ULNAR SHAFT FRACTURE)    Surgeon:  Regan Cedeño MD Responsible Provider:  Tamanna Lopez MD    Anesthesia Type:  general ASA Status:  2          Anesthesia Type: general    Eli Phase I: Eli Score: 10    Eli Phase II: Eli Score: 10    Last vitals: Reviewed and per EMR flowsheets.        Anesthesia Post Evaluation    Patient location during evaluation: PACU  Patient participation: complete - patient participated  Level of consciousness: awake and alert  Pain score: 2  Airway patency: patent  Nausea & Vomiting: no nausea and no vomiting  Complications: no  Cardiovascular status: blood pressure returned to baseline  Respiratory status: acceptable  Hydration status: euvolemic

## 2019-06-06 NOTE — ANESTHESIA PRE PROCEDURE
[facility] Department of Anesthesiology  Pre-Anesthesia Evaluation/Consultation       Name:  Perico Michele  : 1985  Age:  27 y.o. MRN:  [id number]  Date: 2019           Surgeon: Surgeon(s):  Ban Cabrera MD    Procedure: Procedure(s):  OPEN REDUCTION INTERNAL FIXATION RIGHT ULNAR SHAFT FRACTURE WITH MINI C-ARM     No Known Allergies  Patient Active Problem List   Diagnosis    HGSIL on cytologic smear of cervix    Closed fracture of shaft of right ulna     Past Medical History:   Diagnosis Date    Arthritis     ASCUS with positive high risk HPV     Cervical high risk HPV (human papillomavirus) test positive     Contact lens/glasses fitting     Depressive disorder     history anorexia--goes to counseling    Dysmenorrhea     Eating disorder, unspecified     Encounter for insertion of mirena IUD     HSIL (high grade squamous intraepithelial lesion) on Pap smear 13    STD (sexually transmitted disease)     hsv    Unequal leg length (acquired)      Past Surgical History:   Procedure Laterality Date    LEEP  2013,2012    hsil and hpv+    WISDOM TOOTH EXTRACTION       Social History     Tobacco Use    Smoking status: Current Every Day Smoker     Packs/day: 0.50     Years: 14.00     Pack years: 7.00     Types: Cigarettes    Smokeless tobacco: Never Used    Tobacco comment: encouraged to quit/13, cessation 1/15/14/cessasion 11/15   Substance Use Topics    Alcohol use: Yes     Alcohol/week: 0.0 oz     Comment: occasionally    Drug use: No     Medications  No current facility-administered medications on file prior to encounter. Current Outpatient Medications on File Prior to Encounter   Medication Sig Dispense Refill    HYDROcodone-acetaminophen (NORCO) 5-325 MG per tablet Take 1 tablet by mouth every 6 hours as needed for Pain.       valACYclovir (VALTREX) 500 MG tablet Take 500 mg by mouth daily      naproxen sodium (ALEVE) 220 MG tablet Take 220 mg by mouth as needed for Pain      naproxen (NAPROSYN) 500 MG tablet Take 1 tablet by mouth 2 times daily 60 tablet 0     Current Facility-Administered Medications   Medication Dose Route Frequency Provider Last Rate Last Dose    ceFAZolin (ANCEF) 2 g in dextrose 5 % 100 mL IVPB  2 g Intravenous Once Lukas Camilo MD        0.9 % sodium chloride infusion   Intravenous Continuous Rebecca Salgado MD        sodium chloride flush 0.9 % injection 10 mL  10 mL Intravenous 2 times per day Rebecca Salgado MD        sodium chloride flush 0.9 % injection 10 mL  10 mL Intravenous PRN Rebecca Salgado MD         Vital Signs (Current)   Vitals  BP: 108/73 (19)  Temp: 97 °F (36.1 °C) (19)  Temp Source: Temporal (19)  Pulse: 72 (19)  Resp: 16 (19)  SpO2: 100 % (19)  Height: 5' 6\" (167.6 cm) (19 160)  Weight: 125 lb (56.7 kg) (19 160)  Vital Signs Statistics (for past 48 hrs)     Temp  Av °F (36.1 °C)  Min: 97 °F (36.1 °C)   Min taken time: 19  Max: 97 °F (36.1 °C)   Max taken time: 19  Pulse  Av  Min: 67   Min taken time: 19  Max: 67   Max taken time: 19  Resp  Av  Min: 12   Min taken time: 19  Max: 16   Max taken time: 19  BP  Min: 108/73   Min taken time: 19  Max: 108/73   Max taken time: 19  SpO2  Av %  Min: 100 %   Min taken time: 19  Max: 100 %   Max taken time: 19  BP Readings from Last 3 Encounters:   19 108/73   19 115/76   05/31/19 105/63       BMI  Body mass index is 18.98 kg/m². Estimated body mass index is 18.98 kg/m² as calculated from the following:    Height as of this encounter: 5' 6\" (1.676 m). Weight as of this encounter: 117 lb 9.9 oz (53.4 kg).     CBC   Lab Results   Component Value Date    WBC 8.6 2013    RBC 4.59 2013 HGB 14.3 05/13/2013    HCT 41.5 05/13/2013    MCV 90.4 05/13/2013    RDW 11.9 05/13/2013     05/13/2013     CMP    Lab Results   Component Value Date     05/13/2013    K 4.0 05/13/2013     05/13/2013    CO2 30 05/13/2013    BUN 15 05/13/2013    CREATININE 0.7 05/13/2013    GFRAA >60 05/13/2013    GLUCOSE 112 05/13/2013    CALCIUM 9.8 05/13/2013     BMP    Lab Results   Component Value Date     05/13/2013    K 4.0 05/13/2013     05/13/2013    CO2 30 05/13/2013    BUN 15 05/13/2013    CREATININE 0.7 05/13/2013    CALCIUM 9.8 05/13/2013    GFRAA >60 05/13/2013    GLUCOSE 112 05/13/2013     POCGlucose  No results for input(s): GLUCOSE in the last 72 hours. Coags  No results found for: PROTIME, INR, APTT  HCG (If Applicable) No results found for: PREGTESTUR, PREGSERUM, HCG, HCGQUANT   ABGs No results found for: PHART, PO2ART, TEW3DIT, TIV8VQG, BEART, L9ZYLQHV   Type & Screen (If Applicable)  Lab Results   Component Value Date    LABABO O 05/13/2013    LABRH Positive 05/13/2013                            BMI: Wt Readings from Last 3 Encounters:       NPO Status:  >8h                          Anesthesia Evaluation  Patient summary reviewed no history of anesthetic complications:   Airway: Mallampati: II  TM distance: >3 FB     Mouth opening: > = 3 FB Dental:          Pulmonary: breath sounds clear to auscultation  (+) current smoker    (-) COPD, asthma, recent URI and sleep apnea          Patient smoked on day of surgery.                  Cardiovascular:  Exercise tolerance: good (>4 METS),       (-) hypertension and CABG/stent      Rhythm: regular  Rate: normal                    Neuro/Psych:   (+) psychiatric history:   (-) seizures, TIA and CVA           GI/Hepatic/Renal:        (-) GERD, hepatitis, liver disease, no renal disease and no morbid obesity       Endo/Other:        (-) diabetes mellitus, hypothyroidism               Abdominal:           Vascular:     - DVT and PE. Anesthesia Plan      general     ASA 2       Induction: intravenous. MIPS: Postoperative opioids intended and Prophylactic antiemetics administered. Anesthetic plan and risks discussed with patient. Plan discussed with CRNA. This pre-anesthesia assessment may be used as a history and physical.    DOS STAFF ADDENDUM:    Pt seen and examined, chart reviewed (including anesthesia, drug and allergy history). No interval changes to history and physical examination. Anesthetic plan, risks, benefits, alternatives, and personnel involved discussed with patient. Patient verbalized an understanding and agrees to proceed.       Tamanna Lopez MD  June 6, 2019  9:01 AM

## 2019-06-06 NOTE — H&P
Pre-operative Update of H&P:    I  have seen & examined Ms. Karina Mobley related solely to her hand and upper extremity conditions, prior to the scheduled procedure on the date of her surgery. The indications for the planned surgical procedure & and her upper-extremity conditionare unchanged. Please see the Anesthesia Pre-Op Note from date of surgery for Ms. Bianka AGUILAR Albertoashlie's systemic evaluation.

## 2019-06-06 NOTE — PROGRESS NOTES
1123--pt admitted to PACU from OR. Monitors placed. O2 on at 3l/nc/OPA., Pt unarousable. IV patent.  Right arm dressing dry and intact with ace wrap and splint/arm elevated/fingers warm with quick cap refill/

## 2019-06-06 NOTE — PROGRESS NOTES
Pt to Ph 2 from OR. VSS. Kelsey Chung. Dressing clean dry and intact, pt can move fingers well, cap refill less than 3, ROSE MARIE radial pulses. Pt awake, alert, and oriented. Continue to monitor.

## 2019-06-07 NOTE — FLOWSHEET NOTE
Pt denies any questions about her meds. Pt wants to know if she can be seen sooner because she is afraid that the dressing will become dirty before next Friday. Instructed pt to contact the office.

## 2019-06-11 ENCOUNTER — TELEPHONE (OUTPATIENT)
Dept: ORTHOPEDIC SURGERY | Age: 34
End: 2019-06-11

## 2019-06-11 NOTE — TELEPHONE ENCOUNTER
Patient called states that she is feeling the stitches rubbing and pulling on her cast. She states that it is causing a minor amount of pain every time she moves.     Please call patient:  [phone]

## 2019-06-12 ENCOUNTER — OFFICE VISIT (OUTPATIENT)
Dept: ORTHOPEDIC SURGERY | Age: 34
End: 2019-06-12
Payer: COMMERCIAL

## 2019-06-12 VITALS — RESPIRATION RATE: 16 BRPM | HEIGHT: 66 IN | BODY MASS INDEX: 20.09 KG/M2 | WEIGHT: 125 LBS

## 2019-06-12 DIAGNOSIS — S52.201A CLOSED FRACTURE OF SHAFT OF RIGHT ULNA, UNSPECIFIED FRACTURE MORPHOLOGY, INITIAL ENCOUNTER: Primary | ICD-10-CM

## 2019-06-12 PROCEDURE — 29075 APPL CST ELBW FNGR SHORT ARM: CPT | Performed by: ORTHOPAEDIC SURGERY

## 2019-06-12 PROCEDURE — 99024 POSTOP FOLLOW-UP VISIT: CPT | Performed by: ORTHOPAEDIC SURGERY

## 2019-06-12 NOTE — PROGRESS NOTES
Ms. Yuliet Alston returns today in follow-up of her recent right Ulnar Shaft Fracture Repair done approximately 1 week ago. She has done well noting mild discomfort and no other reported complications. She notes no symptoms of numbness, tingling, no symptoms related to perfusion. Physical Exam:  Skin incision is healing well, without erythema, drainage or sign of infection. Digital range of motion is Full and equal bilaterally. FPL function is Intact, EPL function is Intact  Wrist range of motion is limited by painand not stressed today. Sensation is normal in the Whole Hand. Vascular examination reveals normal and good capillary refill. Swelling is mild. There is no clinical evidence of residual skeletal deformity. Radiographic Evaluation:  Radiographs were obtained today (3 views of the right wrist). They demonstrate excellent restoration of alignment of the fracture fragments without sign of hardware loosening or failure of fixation. Impression:  Ms. Yuliet Alston is doing well after recent rightUlnar Shaft Fracture Repair. Plan:  Ms. Yuliet Alston is today returned to a Short-Arm Fiberglass Cast  and given instructions regarding the appropriate wear and use of the device. She is also instructed in work on Active & Passive range of motion of the digits & elbow. These modalities were specifically demonstrated to her today. We discussed the necessary restrictions on the use of the injured hand & wrist and the limitations on resumption of activities. We discussed the appropriate expectations and timeline for symptom improvement. We discussed the option of pursuing formalized hand therapy and a prescription  was not indicated. She is provided a written patient instruction sheet titled: Postoperative Instructions After Wrist Fracture Repair -- #1. I have asked Ms.  Lavell Barragan Asuncion to follow-up with me by scheduling an appointment for 3 weeks from now at which time we will obtain repeat radiographs of the wrist out of the splint. She is also specifically instructed to return to the office or call for an appointment sooner if her symptoms are changing or worsening prior to that time.

## 2019-06-12 NOTE — Clinical Note
Dear  TIMOTHY Jara MD,Thank you very much for your referral or Ms. Char Martin to me for evaluation and treatment of her Hand & Wrist condition. I appreciate your confidence in me and thank you for allowing me the opportunity to care for your patients. If I can be of any further assistance to you on this or any other patient, please do not hesitate to contact me. Sincerely,Young Montalvo MD

## 2019-06-12 NOTE — PROGRESS NOTES
Bailee Tobias MD. ordered a short arm cast  to be applied to [address] [place] [address] right upper Hand . I applied a short arm cast cast to Bianka Roland's right upper Hand. I applied 1 rolls of under padding in a 50/50 fashion. The Prashanth Barragan Asuncion requested black color fiberglass. I rolled 2 rolls of fiberglass in a 50/50 fashion. Her right upper Hand is in a neutral degree position Ata Turner MD .  Checarlos a Roland's nail beds are pink in color, the extremity is warm to the touch. Capillary refill is less than 2 seconds. [address] [place] [address] instructed on proper care of cast.  Do not get wet, keep all items out of cast.  If cast is painful please make appointment to get checked. Patient also briefed on circulation compromise. If digits are cold, blue, and tingling patient must must seek care. If after hours patient is to go to Emergency Room. During office hours patient must come in to office.

## 2019-07-05 ENCOUNTER — OFFICE VISIT (OUTPATIENT)
Dept: ORTHOPEDIC SURGERY | Age: 34
End: 2019-07-05
Payer: COMMERCIAL

## 2019-07-05 VITALS — RESPIRATION RATE: 16 BRPM | BODY MASS INDEX: 20.09 KG/M2 | HEIGHT: 66 IN | WEIGHT: 125 LBS

## 2019-07-05 DIAGNOSIS — S52.201A CLOSED FRACTURE OF SHAFT OF RIGHT ULNA, UNSPECIFIED FRACTURE MORPHOLOGY, INITIAL ENCOUNTER: Primary | ICD-10-CM

## 2019-07-05 PROCEDURE — 99024 POSTOP FOLLOW-UP VISIT: CPT | Performed by: ORTHOPAEDIC SURGERY

## 2019-07-05 PROCEDURE — 29075 APPL CST ELBW FNGR SHORT ARM: CPT | Performed by: ORTHOPAEDIC SURGERY

## 2019-07-06 NOTE — PROGRESS NOTES
to return to the office or call for an appointment sooner if her symptoms are changing or worsening prior to that time.

## 2019-07-19 ENCOUNTER — OFFICE VISIT (OUTPATIENT)
Dept: ORTHOPEDIC SURGERY | Age: 34
End: 2019-07-19
Payer: COMMERCIAL

## 2019-07-19 VITALS — RESPIRATION RATE: 18 BRPM | WEIGHT: 125 LBS | BODY MASS INDEX: 20.09 KG/M2 | HEIGHT: 66 IN

## 2019-07-19 DIAGNOSIS — S52.201A CLOSED FRACTURE OF SHAFT OF RIGHT ULNA, UNSPECIFIED FRACTURE MORPHOLOGY, INITIAL ENCOUNTER: Primary | ICD-10-CM

## 2019-07-19 PROCEDURE — L3908 WHO COCK-UP NONMOLDE PRE OTS: HCPCS | Performed by: PHYSICIAN ASSISTANT

## 2019-07-19 PROCEDURE — 99024 POSTOP FOLLOW-UP VISIT: CPT | Performed by: PHYSICIAN ASSISTANT

## 2019-07-19 PROCEDURE — APPNB15 APP NON BILLABLE TIME 0-15 MINS: Performed by: PHYSICIAN ASSISTANT

## 2021-06-16 PROBLEM — A60.00 GENITAL HERPES SIMPLEX: Status: ACTIVE | Noted: 2021-06-16

## 2021-06-16 PROBLEM — Z85.828 HISTORY OF BASAL CELL CARCINOMA (BCC): Status: ACTIVE | Noted: 2021-06-16

## 2021-08-24 ENCOUNTER — INITIAL CONSULT (OUTPATIENT)
Dept: SURGERY | Age: 36
End: 2021-08-24
Payer: MEDICARE

## 2021-08-24 VITALS — WEIGHT: 119 LBS | DIASTOLIC BLOOD PRESSURE: 60 MMHG | SYSTOLIC BLOOD PRESSURE: 110 MMHG | BODY MASS INDEX: 19.21 KG/M2

## 2021-08-24 DIAGNOSIS — M79.604 PAIN IN BOTH LOWER EXTREMITIES: ICD-10-CM

## 2021-08-24 DIAGNOSIS — M79.605 PAIN IN BOTH LOWER EXTREMITIES: ICD-10-CM

## 2021-08-24 DIAGNOSIS — I83.813 VARICOSE VEINS OF BILATERAL LOWER EXTREMITIES WITH PAIN: Primary | ICD-10-CM

## 2021-08-24 PROCEDURE — G8420 CALC BMI NORM PARAMETERS: HCPCS | Performed by: SURGERY

## 2021-08-24 PROCEDURE — 99243 OFF/OP CNSLTJ NEW/EST LOW 30: CPT | Performed by: SURGERY

## 2021-08-24 PROCEDURE — G8427 DOCREV CUR MEDS BY ELIG CLIN: HCPCS | Performed by: SURGERY

## 2021-08-24 ASSESSMENT — ENCOUNTER SYMPTOMS
EYES NEGATIVE: 1
GASTROINTESTINAL NEGATIVE: 1
ALLERGIC/IMMUNOLOGIC NEGATIVE: 1
RESPIRATORY NEGATIVE: 1

## 2021-08-24 NOTE — PROGRESS NOTES
Aggie Eric MD at [address] [place] [address]         Social History     Socioeconomic History    Marital status:      Spouse name: Not on file    Number of children: Not on file    Years of education: Not on file    Highest education level: Not on file   Occupational History    Not on file   Tobacco Use    Smoking status: Current Every Day Smoker     Packs/day: 0.50     Years: 22.00     Pack years: 11.00     Types: Cigarettes    Smokeless tobacco: Never Used    Tobacco comment: encouraged to quit/6/6/13, cessation 1/15/14/cessasion 11/15   Vaping Use    Vaping Use: Every day    Substances: Always (6 mg)   Substance and Sexual Activity    Alcohol use: Yes     Comment: 1-2 drinks/wk.  Drug use: No    Sexual activity: Yes     Partners: Male   Other Topics Concern    Not on file   Social History Narrative    Not on file     Social Determinants of Health     Financial Resource Strain:     Difficulty of Paying Living Expenses:    Food Insecurity:     Worried About Running Out of Food in the Last Year:     [address] in the Last Year:    Transportation Needs:     Lack of Transportation (Medical):      Lack of Transportation (Non-Medical):    Physical Activity:     Days of Exercise per Week:     Minutes of Exercise per Session:    Stress:     Feeling of Stress :    Social Connections:     Frequency of Communication with Friends and Family:     Frequency of Social Gatherings with Friends and Family:     Attends Oriental orthodox Services:     Active Member of Clubs or Organizations:     Attends Club or Organization Meetings:     Marital Status:    Intimate Partner Violence:     Fear of Current or Ex-Partner:     Emotionally Abused:     Physically Abused:     Sexually Abused:        Family History   Problem Relation Age of Onset    Diabetes Father     Heart Disease Father     Cirrhosis Father     Cancer Maternal Grandmother         breast ca- dx in 62s    Heart Disease Maternal Grandmother     Heart Disease Maternal Grandfather     Parkinsonism Paternal Grandmother     Heart Disease Paternal Grandfather     Rheum Arthritis Neg Hx     Osteoarthritis Neg Hx     Asthma Neg Hx     Breast Cancer Neg Hx     Heart Failure Neg Hx     High Cholesterol Neg Hx     Hypertension Neg Hx     Migraines Neg Hx     Ovarian Cancer Neg Hx     Rashes/Skin Problems Neg Hx     Seizures Neg Hx     Stroke Neg Hx     Thyroid Disease Neg Hx          Current Outpatient Medications:     Prenatal Vit-DSS-Fe Cbn-FA (PRENATAL ADVANTAGE PO), Take by mouth, Disp: , Rfl:     valACYclovir (VALTREX) 500 MG tablet, Take 1 tablet by mouth daily, Disp: 90 tablet, Rfl: 1    Patient has no known allergies. Vitals:    08/24/21 0944   BP: 110/60   Weight: 119 lb (54 kg)       Office Visit on 06/16/2021   Component Date Value Ref Range Status    Color, UA 06/16/2021 yellow   Final    Clarity, UA 06/16/2021 clear   Final    Glucose, UA POC 06/16/2021 neg   Final    Bilirubin, UA 06/16/2021 neg   Final    Ketones, UA 06/16/2021 neg   Final    Spec Grav, UA 06/16/2021 1.015   Final    Blood, UA POC 06/16/2021 trace   Final    pH, UA 06/16/2021 7.0   Final    Protein, UA POC 06/16/2021 neg   Final    Urobilinogen, UA 06/16/2021 0.2   Final    Leukocytes, UA 06/16/2021 neg   Final    Nitrite, UA 06/16/2021 neg   Final       Review of Systems   Constitutional: Negative. HENT: Negative. Eyes: Negative. Respiratory: Negative. Cardiovascular: Positive for leg swelling. Gastrointestinal: Negative. Endocrine: Negative. Genitourinary: Negative. Musculoskeletal: Negative. Skin: Negative. Allergic/Immunologic: Negative. Neurological: Negative. Hematological: Negative. Psychiatric/Behavioral: Negative. All other systems reviewed and are negative. Physical Exam  Vitals and nursing note reviewed. Constitutional:       Appearance: Normal appearance.  She is well-developed. HENT:      Mouth/Throat:      Pharynx: No oropharyngeal exudate. Eyes:      Conjunctiva/sclera: Conjunctivae normal.      Pupils: Pupils are equal, round, and reactive to light. Cardiovascular:      Rate and Rhythm: Normal rate and regular rhythm. Pulses:           Carotid pulses are 2+ on the right side and 2+ on the left side. Radial pulses are 2+ on the right side and 2+ on the left side. Femoral pulses are 2+ on the right side and 2+ on the left side. Dorsalis pedis pulses are 2+ on the right side and 2+ on the left side. Posterior tibial pulses are 2+ on the right side and 2+ on the left side. Heart sounds: Normal heart sounds. Comments:     MEASUREMENTS 8/24/2021:    RIGHT ANKLE: 29.0 cm   RIGHT CALF: 31.5 cm     LEFT ANKLE: 29.5 cm   LEFT CALF: 32.0 cm     Pulmonary:      Effort: Pulmonary effort is normal.      Breath sounds: Normal breath sounds. Chest:       Abdominal:      General: Bowel sounds are normal.       Musculoskeletal:         General: Normal range of motion. Arms:       Cervical back: Normal range of motion. Legs:    Neurological:      Mental Status: She is alert and oriented to person, place, and time. Deep Tendon Reflexes: Reflexes are normal and symmetric. Psychiatric:         Speech: Speech normal.         Behavior: Behavior normal.         Thought Content: Thought content normal.         Judgment: Judgment normal.       Ms. Ginny Flores has one child a girl ,who is [de-identified] old. 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 She has had no surgeries.   She has done having children    ASSESSMENT:    Problem List Items Addressed This Visit     Pain in both lower extremities      Other Visit Diagnoses     Varicose veins of bilateral lower extremities with pain    -  Primary PLAN:    You are being given a prescription for 20-30 and/or 30-40 mmHg compression stockings. Please try to wear these daily. Schedule to return in three months for a bilateral lower extremity reflux study and office visit. Willa Garcia MA am scribing for and in the presence of Lazarus Smith MD on this date of 08/24/21    I Preet Hernandez MD personally performed the services described in this documentation as scribed by the Medical Assistant Clemente Vicente in my presence and it is both accurate and complete.         Electronically signed by Lazarus Smith MD on 8/24/2021 at 10:23 AM

## 2021-08-24 NOTE — PATIENT INSTRUCTIONS
You are being given a prescription for 20/30 and/or 30-40 mmHg compression stockings. Please try to wear these daily. Schedule to return in three months for a bilateral lower extremity reflux study and office visit.

## 2022-05-06 ENCOUNTER — OFFICE VISIT (OUTPATIENT)
Dept: VASCULAR SURGERY | Age: 37
End: 2022-05-06
Payer: MEDICARE

## 2022-05-06 ENCOUNTER — PROCEDURE VISIT (OUTPATIENT)
Dept: SURGERY | Age: 37
End: 2022-05-06
Payer: MEDICARE

## 2022-05-06 VITALS
HEIGHT: 66 IN | BODY MASS INDEX: 19.13 KG/M2 | WEIGHT: 119 LBS | DIASTOLIC BLOOD PRESSURE: 68 MMHG | SYSTOLIC BLOOD PRESSURE: 118 MMHG

## 2022-05-06 DIAGNOSIS — I83.813 VARICOSE VEINS OF BOTH LOWER EXTREMITIES WITH PAIN: ICD-10-CM

## 2022-05-06 DIAGNOSIS — M79.604 PAIN IN BOTH LOWER EXTREMITIES: ICD-10-CM

## 2022-05-06 DIAGNOSIS — M79.605 PAIN IN BOTH LOWER EXTREMITIES: Primary | ICD-10-CM

## 2022-05-06 DIAGNOSIS — M79.605 PAIN IN BOTH LOWER EXTREMITIES: ICD-10-CM

## 2022-05-06 DIAGNOSIS — M79.604 PAIN IN BOTH LOWER EXTREMITIES: Primary | ICD-10-CM

## 2022-05-06 DIAGNOSIS — I83.813 VARICOSE VEINS OF BILATERAL LOWER EXTREMITIES WITH PAIN: Primary | ICD-10-CM

## 2022-05-06 PROCEDURE — G8427 DOCREV CUR MEDS BY ELIG CLIN: HCPCS | Performed by: SURGERY

## 2022-05-06 PROCEDURE — G8420 CALC BMI NORM PARAMETERS: HCPCS | Performed by: SURGERY

## 2022-05-06 PROCEDURE — 4004F PT TOBACCO SCREEN RCVD TLK: CPT | Performed by: SURGERY

## 2022-05-06 PROCEDURE — 99214 OFFICE O/P EST MOD 30 MIN: CPT | Performed by: SURGERY

## 2022-05-06 PROCEDURE — 93970 EXTREMITY STUDY: CPT | Performed by: SURGERY

## 2022-05-06 ASSESSMENT — ENCOUNTER SYMPTOMS
GASTROINTESTINAL NEGATIVE: 1
EYES NEGATIVE: 1
RESPIRATORY NEGATIVE: 1
ALLERGIC/IMMUNOLOGIC NEGATIVE: 1

## 2022-05-06 NOTE — PATIENT INSTRUCTIONS
You would need lesser saphenous vein ligation and stab phlebectomies, greater than 20. Dr. Simon Ruff will do both legs at one time. We will submit to your insurance company to see if surgery would be covered. After we are notified of insurance decision, we will call you to schedule surgery. Should you hear from your insurance first, please call the office to let us know.

## 2022-05-06 NOTE — PROGRESS NOTES
Daily Progress Note   Xiomara Acevedo MD      5/6/2022    Chief Complaint   Patient presents with    Follow-up     Pt is here today for a 3 mo followup visit. HISTORY OF PRESENT ILLNESS:                The patient is a 44 y.o. female who presents with a follow up for a bilateral lower extremity reflux study for varicose veins. The scan today shows reflux of both legs. Ms. Sherman Solomon has been wearing her compression stockings daily, especially while working as she is on her feet all day at work as an . The stocking help slightly, but she still has pain and some swelling.        Past Medical History:   Diagnosis Date    Alopecia     Arthritis     ASCUS with positive high risk HPV     Cervical high risk HPV (human papillomavirus) test positive     Contact lens/glasses fitting     Depressive disorder     history anorexia--goes to counseling    Dysmenorrhea     Eating disorder, unspecified     Encounter for insertion of mirena IUD     HSIL (high grade squamous intraepithelial lesion) on Pap smear 02/26/2013    STD (sexually transmitted disease)     hsv    Unequal leg length (acquired)        Past Surgical History:   Procedure Laterality Date    LEEP  2/26/2013,8/2012    hsil and hpv+    ORIF DISTAL RADIUS FRACTURE Right 6/6/2019    OPEN REDUCTION INTERNAL FIXATION RIGHT ULNAR SHAFT FRACTURE WITH MINI C-ARM performed by Wally Palmer MD at [address] History     Socioeconomic History    Marital status:      Spouse name: Not on file    Number of children: Not on file    Years of education: Not on file    Highest education level: Not on file   Occupational History    Not on file   Tobacco Use    Smoking status: Current Every Day Smoker     Packs/day: 0.50     Years: 22.00     Pack years: 11.00     Types: Cigarettes    Smokeless tobacco: Never Used    Tobacco comment: encouraged to quit/6/6/13, cessation 1/15/14/cessasion 11/15 Vaping Use    Vaping Use: Every day    Substances: Always (6 mg)   Substance and Sexual Activity    Alcohol use: Yes     Comment: 1-2 drinks/wk.  Drug use: No    Sexual activity: Yes     Partners: Male   Other Topics Concern    Not on file   Social History Narrative    Not on file     Social Determinants of Health     Financial Resource Strain: Low Risk     Difficulty of Paying Living Expenses: Not hard at all   Food Insecurity: No Food Insecurity    Worried About Running Out of Food in the Last Year: Never true    [address] in the Last Year: Never true   Transportation Needs:     Lack of Transportation (Medical): Not on file    Lack of Transportation (Non-Medical):  Not on file   Physical Activity:     Days of Exercise per Week: Not on file    Minutes of Exercise per Session: Not on file   Stress:     Feeling of Stress : Not on file   Social Connections:     Frequency of Communication with Friends and Family: Not on file    Frequency of Social Gatherings with Friends and Family: Not on file    Attends [employer]ist Services: Not on file    Active Member of [address] [place] [address] or Organizations: Not on file    Attends Club or Organization Meetings: Not on file    Marital Status: Not on file   Intimate Partner Violence:     Fear of Current or Ex-Partner: Not on file    Emotionally Abused: Not on file    Physically Abused: Not on file    Sexually Abused: Not on file   Housing Stability:     Unable to Pay for Housing in the Last Year: Not on file    Number of [place] in the Last Year: Not on file    Unstable Housing in the Last Year: Not on file       Family History   Problem Relation Age of Onset    Diabetes Father     Heart Disease Father     Cirrhosis Father     Cancer Maternal Grandmother         breast ca- dx in 62s    Heart Disease Maternal Grandmother     Heart Disease Maternal Grandfather     Parkinsonism Paternal Grandmother     Heart Disease Paternal Grandfather     Rheum Arthritis Neg Hx     Osteoarthritis Neg Hx     Asthma Neg Hx     Breast Cancer Neg Hx     Heart Failure Neg Hx     High Cholesterol Neg Hx     Hypertension Neg Hx     Migraines Neg Hx     Ovarian Cancer Neg Hx     Rashes/Skin Problems Neg Hx     Seizures Neg Hx     Stroke Neg Hx     Thyroid Disease Neg Hx          Current Outpatient Medications:     valACYclovir (VALTREX) 500 MG tablet, Take 1 tablet by mouth daily, Disp: 90 tablet, Rfl: 3    ciclopirox (PENLAC) 8 % solution, Apply topically nightly, remove with alcohol every 7 days, Disp: 6 mL, Rfl: 0    Prenatal Vit-DSS-Fe Cbn-FA (PRENATAL ADVANTAGE PO), Take by mouth, Disp: , Rfl:     Patient has no known allergies. Vitals:    05/06/22 1050   BP: 118/68   Weight: 119 lb (54 kg)   Height: 5' 6\" (1.676 m)       Orders Only on 03/07/2022   Component Date Value Ref Range Status    SARS-CoV-2, PCR 03/07/2022 Not Detected  Not Detected Final    Comment: This test has been authorized by FDA under an  Emergency Use Authorization (EUA). This test is only authorized for the duration of the  time of declaration that circumstances exist justifying the  authorization of the emergency use of in vitro diagnostic  testing for detection of the SARS-CoV-2 virus  and/or diagnosis of COVID-19 infection under  section 564 (b)(1) of the Act, 21 U. S.C. 717JUC-0 (b) (1),  unless the authorization is terminated or revoked sooner. Patient Fact [email or web address]  Provider Fact [email or web address]    METHODOLOGY: RT PCR         Review of Systems   Constitutional: Negative. HENT: Negative. Eyes: Negative. Respiratory: Negative. Cardiovascular: Positive for leg swelling. Gastrointestinal: Negative. Endocrine: Negative. Genitourinary: Negative. Musculoskeletal: Negative. Skin: Negative. Allergic/Immunologic: Negative. Neurological: Negative. Hematological: Negative. Psychiatric/Behavioral: Negative. All other systems reviewed and are negative. Physical Exam  Vitals and nursing note reviewed. Constitutional:       Appearance: Normal appearance. She is well-developed. HENT:      Mouth/Throat:      Pharynx: No oropharyngeal exudate. Eyes:      Conjunctiva/sclera: Conjunctivae normal.      Pupils: Pupils are equal, round, and reactive to light. Cardiovascular:      Rate and Rhythm: Normal rate and regular rhythm. Pulses:           Carotid pulses are 2+ on the right side and 2+ on the left side. Radial pulses are 2+ on the right side and 2+ on the left side. Femoral pulses are 2+ on the right side and 2+ on the left side. Dorsalis pedis pulses are 2+ on the right side and 2+ on the left side. Posterior tibial pulses are 2+ on the right side and 2+ on the left side. Heart sounds: Normal heart sounds. Comments:   MEASUREMENTS 05/06/22:    RIGHT ANKLE: 19.1 cm   RIGHT CALF: 32.4 cm     LEFT ANKLE: 19.8 cm   LEFT CALF: 32.8 cm     MEASUREMENTS 8/24/2021:    RIGHT ANKLE: 29.0 cm   RIGHT CALF: 31.5 cm     LEFT ANKLE: 29.5 cm   LEFT CALF: 32.0 cm     Pulmonary:      Effort: Pulmonary effort is normal.      Breath sounds: Normal breath sounds. Chest:       Abdominal:      General: Bowel sounds are normal.       Musculoskeletal:         General: Normal range of motion. Arms:       Cervical back: Normal range of motion. Legs:    Neurological:      Mental Status: She is alert and oriented to person, place, and time. Deep Tendon Reflexes: Reflexes are normal and symmetric. Psychiatric:         Speech: Speech normal.         Behavior: Behavior normal.         Thought Content:  Thought content normal.         Judgment: Judgment normal.           ASSESSMENT:    Problem List Items Addressed This Visit     Pain in both lower extremities - Primary      Other Visit Diagnoses     Varicose veins of both lower extremities with pain          Her scan shows incompetent lesser saphenous veins bilaterally and associated with varicosities in addition on the right her common femoral shows some reflux    PLAN:    You would need lesser saphenous vein ligation and stab phlebectomies bilaterally, greater than 20. Dr. Jose Acosta will do both legs at one time prone position. We will submit to your insurance company to see if surgery would be covered. After we are notified of insurance decision, we will call you to schedule surgery. Should you hear from your insurance first, please call the office to let us know. Nyla Cross MA am scribing for and in the presence of Sandrine Tadeo MD on this date of 05/06/22  I Deon Lyons MD personally performed the services described in this documentation as scribed by the Medical Assistant Tee Vicente in my presence and it is both accurate and complete.         Electronically signed by Sandrine Tadeo MD on 5/6/2022 at 11:38 AM

## 2022-05-12 ENCOUNTER — OFFICE VISIT (OUTPATIENT)
Dept: ORTHOPEDIC SURGERY | Age: 37
End: 2022-05-12
Payer: MEDICARE

## 2022-05-12 VITALS — WEIGHT: 120 LBS | BODY MASS INDEX: 19.29 KG/M2 | HEIGHT: 66 IN

## 2022-05-12 DIAGNOSIS — M53.3 SI (SACROILIAC) JOINT DYSFUNCTION: ICD-10-CM

## 2022-05-12 DIAGNOSIS — M54.50 LOW BACK PAIN, UNSPECIFIED BACK PAIN LATERALITY, UNSPECIFIED CHRONICITY, UNSPECIFIED WHETHER SCIATICA PRESENT: Primary | ICD-10-CM

## 2022-05-12 DIAGNOSIS — M47.816 LUMBAR FACET ARTHROPATHY: ICD-10-CM

## 2022-05-12 PROCEDURE — G8427 DOCREV CUR MEDS BY ELIG CLIN: HCPCS | Performed by: PHYSICIAN ASSISTANT

## 2022-05-12 PROCEDURE — 4004F PT TOBACCO SCREEN RCVD TLK: CPT | Performed by: PHYSICIAN ASSISTANT

## 2022-05-12 PROCEDURE — G8420 CALC BMI NORM PARAMETERS: HCPCS | Performed by: PHYSICIAN ASSISTANT

## 2022-05-12 PROCEDURE — 99213 OFFICE O/P EST LOW 20 MIN: CPT | Performed by: PHYSICIAN ASSISTANT

## 2022-05-12 NOTE — PROGRESS NOTES
History of present illness:   Ms. Purnima Lyons is a pleasant 44 y.o. female kindly referred by PATTY Corbett CNP for consultation regarding her chronic LBP. She states she was diagnosed with arthritis in her lower back in 2003. She has intermittent episodes of low back pain which occur every 3 to 4 months. Symptoms tend to last for approximately 1 week. At the present time, she is not having pain in her lower back. She rates her back pain when she is experiencing pain 8/10 VAS pain is in the midline of her lower back and does radiate to her left and right SI joint region/buttocks. She denies leg pain or radicular symptoms. She describes the pain as dull ache, throbbing discomfort. She states pain is worse with forward flexion and extension. She denies numbness and tingling into the lower extremities. She denies weakness of her left or right leg. She denies bowel or bladder dysfunction and saddle anesthesia. She states she is experiencing pain, she can sit for a maximum of 10 minutes and stand for a maximum 10 minutes. The pain can interrupt her sleep. She has tried ibuprofen in the past.  After several days it does seem to help her pain. She has been to therapy years ago but does not recall the exercise program.        Past medical history:  Her past medical history has been reviewed. Past Medical History:   Diagnosis Date    Alopecia     Arthritis     ASCUS with positive high risk HPV     Cervical high risk HPV (human papillomavirus) test positive     Contact lens/glasses fitting     Depressive disorder     history anorexia--goes to counseling    Dysmenorrhea     Eating disorder, unspecified     Encounter for insertion of mirena IUD     HSIL (high grade squamous intraepithelial lesion) on Pap smear 02/26/2013    STD (sexually transmitted disease)     hsv    Unequal leg length (acquired)        Her past surgical history has been reviewed.   Past Surgical History: Procedure Laterality Date    LEEP  2/26/2013,8/2012    hsil and hpv+    ORIF DISTAL RADIUS FRACTURE Right 6/6/2019    OPEN REDUCTION INTERNAL FIXATION RIGHT ULNAR SHAFT FRACTURE WITH MINI C-ARM performed by Brenda Winn MD at [address]           Her medications and allergies were reviewed. Current Outpatient Medications   Medication Sig Dispense Refill    valACYclovir (VALTREX) 500 MG tablet Take 1 tablet by mouth daily 90 tablet 3    Prenatal Vit-DSS-Fe Cbn-FA (PRENATAL ADVANTAGE PO) Take by mouth       No current facility-administered medications for this visit. Her social history has been reviewed. Social History     Occupational History    Not on file   Tobacco Use    Smoking status: Current Every Day Smoker     Packs/day: 0.50     Years: 22.00     Pack years: 11.00     Types: Cigarettes    Smokeless tobacco: Never Used    Tobacco comment: encouraged to quit/6/6/13, cessation 1/15/14/cessasion 11/15   Vaping Use    Vaping Use: Every day    Substances: Always (6 mg)   Substance and Sexual Activity    Alcohol use: Yes     Comment: 1-2 drinks/wk.  Drug use: No    Sexual activity: Yes     Partners: Male         Her family history has been reviewed.    Family History   Problem Relation Age of Onset    Diabetes Father     Heart Disease Father     Cirrhosis Father     Cancer Maternal Grandmother         breast ca- dx in 62s    Heart Disease Maternal Grandmother     Heart Disease Maternal Grandfather     Parkinsonism Paternal Grandmother     Heart Disease Paternal Grandfather     Rheum Arthritis Neg Hx     Osteoarthritis Neg Hx     Asthma Neg Hx     Breast Cancer Neg Hx     Heart Failure Neg Hx     High Cholesterol Neg Hx     Hypertension Neg Hx     Migraines Neg Hx     Ovarian Cancer Neg Hx     Rashes/Skin Problems Neg Hx     Seizures Neg Hx     Stroke Neg Hx     Thyroid Disease Neg Hx          Review of Systems:  I have reviewed the clinically relevant past medical history, medications, allergies, family history, social history, and 13 point Review of Systems from the patient's recent history form & documented any details relevant to today's presenting complaints in the history above. The patient's self-reported past medical history, medications, allergies, family history, social history, and Review of Systems form from today's date have been scanned into the chart under the \"Media\" tab. Patient's review of symptoms was reviewed and is significant for back pain and negative for recent weight loss, fatigue, chills, visual disturbances, blood in stool or urine, recent infection. Physical examination:  Ms. Harris Collins most recent vitals:  Vitals  Height: 5' 6\" (167.6 cm)  Weight: 120 lb (54.4 kg)  Body mass index is 19.37 kg/m². General exam:  She is well-developed and well-nourished, is in obvious discomfort and alert and oriented to person, place, and time. She demonstrates appropriate mood and affect. Her skin is warm and dry. Her gait is normal and she walks heel to toe without significant limp or instability. Back:  She stands with slight lumbar flexion. Her lumbar flexion is limited, extension, extension with rotation and lateral bending are not restricted with pain. She has mild tenderness over her lower lumbar spine without paraspinous muscle spasm. She has mild tenderness over her left and right sacroiliac joints. The skin over her lumbar spine is normal without a surgical scar. Lower extremities:  She has 5/5 motor strength of bilateral lower extremities. She has a negative straight leg raise on the left and right. Deep tendon reflexes:   Left patella 2+. Right patella 2+. Left achilles 2+. Right achilles 2+. Sensation is intact to light touch L3 to S1 bilaterally. She has no clonus. Hip range of motion is normal.  Stinchfield test is negative.   Tien's test positive bilateral.  Piriformis stretch test negative bilateral.        Imaging:  X rays AP and lateral lumbar spine obtained in the office today. Mild lower lumbar facet joint arthritis. No significant degenerative disc disease. IUD noted left of midline. Diagnosis:      ICD-10-CM    1. Low back pain, unspecified back pain laterality, unspecified chronicity, unspecified whether sciatica present  M54.50 XR LUMBAR SPINE (2-3 VIEWS)   2. SI (sacroiliac) joint dysfunction  M53.3 Ambulatory referral to Physical Therapy   3. Lumbar facet arthropathy  M47.816 Ambulatory referral to Physical Therapy          Assessment/ Plan:    Chronic low back pain felt to be related to lumbar facet arthritis and sacroiliac joint dysfunction. At the present time she is not having a flareup. She does experience flareup once every 3 or 4 months. These tend to last for about a week. They do respond to ibuprofen and activity modification. I had an extensive discussion with Ms. Hersey Pallas and/or family regarding the natural history, etiology, and long term consequences of her condition. I have presented reasonable alternatives to the patient's proposed care, treatment, and services. Risks and benefits of the treatment options also reviewed in detail. I have outlined a treatment plan with them. She has had full opportunity to ask her questions. I have answered them all to her satisfaction. I feel that Ms. Hersey Pallas understands our discussion today. New Medications prescribed today-   OTC NSAIDS discussed. 1. The most common side effects from NSAIDs are stomachaches, heartburn, and nausea. NSAIDs may irritate the stomach lining. If the medicine upsets your stomach, you can try taking it with food. But if that doesn't help, talk with your doctor to make sure it's not a more serious problem, such as a stomach ulcer or bleeding in the stomach or intestines. 2. Using NSAIDs may:  ? Lead to high blood pressure.   ? Make symptoms of heart failure worse. ? Raise the risk of heart attack, stroke, kidney damage, and skin reactions. 3. Your risks are greater if you take NSAIDs at higher doses or for longer than the label says. People who are older than 72 or who have heart, stomach, or intestinal disease have a higher risk for problems. PT-PT Rx was provided today. Procedures-discussed possible lumbar facet injections, medial branch blocks, radiofrequency ablation if her low back pain persist.  She would require MRI testing prior to those recommendations. Follow up-6 weeks. She was instructed to call us emergently if she begins to experience bowel or bladder dysfunction, saddle anesthesia, increasing muscle weakness, or onset/ worsening leg symptoms. Harika Leary PA-C   Senior Physician Assistant   [facility]                                         Disclaimer: This note was generated with use of a verbal recognition program (DRAGON) and an attempt was made to check for errors. It is possible that there are still dictated errors within this office note. If so, please bring any significant errors to my attention for an addendum. All efforts were made to ensure that this office note is accurate.

## 2022-06-14 ENCOUNTER — TELEPHONE (OUTPATIENT)
Dept: SURGERY | Age: 37
End: 2022-06-14

## 2022-06-14 NOTE — TELEPHONE ENCOUNTER
Spoke with [employer] regarding prior authorization request for CPT 83724 & 91283 - Bilateral.  Both CPT Codes do not require a prior authorization as long as providers and facilities are In Network and appropriate ICD-10 code (S66.166) is used. Policy #[id number]. Ref:  [id number]. Please contact patient to coordinate surgery dates.

## 2022-06-16 ENCOUNTER — TELEPHONE (OUTPATIENT)
Dept: SURGERY | Age: 37
End: 2022-06-16

## 2022-06-20 ENCOUNTER — HOSPITAL ENCOUNTER (OUTPATIENT)
Dept: PHYSICAL THERAPY | Age: 37
Setting detail: THERAPIES SERIES
Discharge: HOME | End: 2022-06-20
Payer: MEDICARE

## 2022-06-20 PROCEDURE — 97161 PT EVAL LOW COMPLEX 20 MIN: CPT

## 2022-06-20 PROCEDURE — 97112 NEUROMUSCULAR REEDUCATION: CPT

## 2022-06-20 PROCEDURE — 97530 THERAPEUTIC ACTIVITIES: CPT

## 2022-06-20 NOTE — FLOWSHEET NOTE
[facility], [place]                                                         Physical Therapy Daily Treatment Note  Date:  2022    Patient Name:  Delon George    :  1985  MRN: [id number]    Medical/Treatment Diagnosis Information:  · Diagnosis: M53.3 (ICD-10-CM) - SI (sacroiliac) joint [id number] (ICD-10-CM) - Lumbar facet arthropathy  Treatment Diagnosis: M54.50  Low back pain, unspecified  Insurance/Certification information:  PT Insurance Information: [facility]  Physician Information:  Referring Provider (secondary): Dr. Satish Youngblood  Has the plan of care been signed (Y/N):        []  Yes  [x]  No     Date of Patient follow up with Physician: PRN      Is this a Progress Report:     []  Yes  [x]  No        If Yes:  Date Range for reporting period:  Beginning- 2022   Ending    Progress report will be due (10 Rx or 30 days whichever is less):        Recertification will be due (POC Duration  / 90 days whichever is less): as above        Visit # Insurance Allowable Auth Required   1 30 VPCY []  Yes [x]  No        Functional Scale:     OUTCOME MEASURE DATE DEFICIT   FOTO  IE 31% Deficit         Latex Allergy:  [x]NO      []YES  Preferred Language for Healthcare:   [x]English       []other:      Pain level:  0/10  on 2022    SUBJECTIVE:  See eval    OBJECTIVE: See eval   Observation:    Test measurements:        RESTRICTIONS/PRECAUTIONS: none reported    Exercises/Interventions:   Exercise Resistance/Repetitions Other comments   Stretching:      Hamstring stretch  EOT   Piriformis stretch- figure 4 4x20\"    Knee to opposite shoulder     LTR     Open book     [address]     Child's pose     Prone prop on elbow     Prone extn to outstretched hands     Standing lumbar extn     Seated 3 way SB rollout flexion           Seated forward flexion     Cat/camel     Hip flexor stretch half kneeling Ranulfo zarate          Neural mobilization:     Sciatic nerve glides          Strengthening/stabilization:     Posterior pelvic tilts 5x5\"    TrA contraction     TrA with ball squeeze     TrA contraction with alt marches x10    TrA with alt 90/90 heel taps x10         Prone alt UE lift     Prone alt UE/LE lift     Prone towel squeeze with B leg lift   Pillow under hips to avoid lumbar hyperextn   Prone heel clicks     Prone leaning over edge of table hip extn     Supine B hip abd with TB     clams     Standing clam with back foot against wall     Bridge- DL 2x10    Cook bridge     SL bridge     Bridge on [facility] on [place]     Clamshells  x15 B    Quadruped position with knee on airex with opposite hip lift     Bird/dog     Quadruped with hip abd     Quadruped with hip circles     Quadruped with foot against ball on wall     Opposite UE to LE isometric core     Leg drop hold     [place] [employer] with glute set     Half kneeling on airex glute set + stabilization exercise          Hip abduction isometric into wall standing     Tandem stance + stabilization exercise     SLS  [place] LE at 90   High march with opposite hip stabilization standing     TrA/mutlifidi walk outs     TrA/multifidi pallof press     Scapular retraction     Seated SB marches          squats     Leg press     Lateral band walks     SB pikes     SB knee tucks          planks     Side planks          Frozen Bears               Manual treatment:                                Patient education: Pt was educated on PT diagnosis, prognosis, and plan of care. Pt was educated on the use of ice throughout the day.        Therapeutic Exercise and NMR EXR  [x] (98418) Provided verbal/tactile cueing for activities related to strengthening, flexibility, endurance, ROM  for improvements in proximal hip and core control with self care, mobility, lifting and ambulation.  [] (61903) Provided verbal/tactile cueing for activities related to improving balance, coordination, kinesthetic sense, posture, motor skill, proprioception  to assist with core control in self care, mobility, lifting, and ambulation. Therapeutic Activities:    [x] (65741 or 30706) Provided verbal/tactile cueing for activities related to improving balance, coordination, kinesthetic sense, posture, motor skill, proprioception and motor activation to allow for proper function  with self care and ADLs  [] (29317) Provided training and instruction to the patient for proper core and proximal hip recruitment and positioning with ambulation re-education     Home Exercise Program:    [x] (12079) Reviewed/Progressed HEP activities related to strengthening, flexibility, endurance, ROM of core, proximal hip and LE for functional self-care, mobility, lifting and ambulation   [] (44197) Reviewed/Progressed HEP activities related to improving balance, coordination, kinesthetic sense, posture, motor skill, proprioception of core, proximal hip and LE for self care, mobility, lifting, and ambulation      Manual Treatments:  PROM / STM / Oscillations-Mobs:  G-I, II, III, IV (PA's, Inf., Post.)  [] (39482) Provided manual therapy to mobilize proximal hip and LS spine soft tissue/joints for the purpose of modulating pain, promoting relaxation,  increasing ROM, reducing/eliminating soft tissue swelling/inflammation/restriction, improving soft tissue extensibility and allowing for proper ROM for normal function with self care, mobility, lifting and ambulation. Modalities:       Charges:  Timed Code Treatment Minutes: 23'   Total Treatment Minutes: 21'   301-045  [x] EVAL (LOW) 30719   [] EVAL (MOD) 83690   [] EVAL (HIGH) 47468   [] RE-EVAL   [] UB(47272) x     [] IONTO  [x] NMR (82685) x  1   [] VASO  [] Manual (64112) x      [] Other:  [x] TA x   1   [] Mech Traction (52892)  [] ES(attended) (36869)      [] ES (un) (90122):       HEP instruction:   Access Code: [email or web address]  URL: [email or web address]  Date: 06/20/2022  Prepared by: Nurys Melton  (see scanned forms)    GOALS:  Patient stated goal: lessen pain flare ups    Therapist goals for Patient:     Short Term Goals: To be achieved in: 2 weeks  1. Independent in HEP and progression per patient tolerance, in order to prevent re-injury. [] Progressing: [] Met: [] Not Met: [] Adjusted   2. Patient will have a decrease in pain to facilitate improvement in movement, function, and ADLs as indicated by Functional Deficits. [] Progressing: [] Met: [] Not Met: [] Adjusted    Long Term Goals: To be achieved in: 6 weeks  1. Disability index score of 24% or less for the FOTO to assist with reaching prior level of function. [] Progressing: [] Met: [] Not Met: [] Adjusted  2. Patient will demonstrate increased lumbar extension AROM to less than or equal to 50% limitation and pain free to allow for proper joint functioning as indicated by patients Functional Deficits. [] Progressing: [] Met: [] Not Met: [] Adjusted  3. Patient will demonstrate an increase in abduction strength to > or = to 4+/5 to allow for proper functional mobility as indicated by patients Functional Deficits. [] Progressing: [] Met: [] Not Met: [] Adjusted  4. Patient will complete one flight of stair negotiation without increased symptoms or restriction. [] Progressing: [] Met: [] Not Met: [] Adjusted  5. Patient will be able to ambulate 1 miles without increased symptoms or restriction     [] Progressing: [] Met: [] Not Met: [] Adjusted        Overall Progression Towards Functional goals/ Treatment Progress Update:  [] Patient is progressing as expected towards functional goals listed. [] Progression is slowed due to complexities/Impairments listed. [] Progression has been slowed due to co-morbidities.   [x] Plan just implemented, too soon to assess goals progression <30days   [] Goals require adjustment due to lack of progress  [] Patient is not progressing as expected and requires additional follow up with physician  [] Other    Prognosis for POC: [x] Good [] Fair  [] Poor      Patient requires continued skilled intervention: [x] Yes  [] No      ASSESSMENT:  See eval    Treatment/Activity Tolerance:  [x] Patient tolerated treatment well [] Patient limited by fatique  [] Patient limited by pain  [] Patient limited by other medical complications  [] Other:       PLAN: See eval  [] Continue per plan of care [] Alter current plan (see comments above)  [x] Plan of care initiated [] Hold pending MD visit [] Discharge    Note: If patient does not return for scheduled/ recommended follow up visits, this note will serve as a discharge from care along with most recent update on progress.      Electronically signed by: Elieser Springer, PT, DPT

## 2022-06-20 NOTE — PLAN OF CARE
[address] 71, [address] [place] [address], [address] [place] [address]  Phone: [phone]   Fax: [phone]                                                          Physical Therapy Certification    Dear Referring Provider (secondary): Dr. Dorothey Ganser,    We had the pleasure of evaluating the following patient for physical therapy services at [address] [place] [address]. A summary of our findings can be found in the initial assessment below. This includes our plan of care. If you have any questions or concerns regarding these findings, please do not hesitate to contact me at the office phone number checked above. Thank you for the referral.       Physician Signature:_______________________________Date:__________________  By signing above (or electronic signature), therapists plan is approved by physician      Patient: Jose Fishman   : 1985   MRN: [id number]  Referring Physician: Referring Provider (secondary): Dr. Dorothey Ganser      Evaluation Date: 2022      Medical Diagnosis Information:  Diagnosis: M53.3 (ICD-10-CM) - SI (sacroiliac) joint [id number] (ICD-10-CM) - Lumbar facet arthropathy   Treatment Diagnosis: M54.50  Low back pain, unspecified                                           Precautions/ Contra-indications: none reported    C-SSRS Triggered by Intake questionnaire (Past 2 wk assessment):   [x] No, Questionnaire did not trigger screening.   [] Yes, Patient intake triggered further evaluation      [] C-SSRS Screening completed  [] PCP notified via Plan of Care  [] Emergency services notified     Latex Allergy:  [x]NO      []YES  Preferred Language for Healthcare:   [x]English       []other:    SUBJECTIVE: Patient stated complaint: low back pain, L > R. Reports having arthritic and SI pain since . There are times she is pain free, and other times when she cannot move.  She remembers back in the day she took a step and felt a pull. She would like to strengthen the region to decrease flare ups. She states she had physical therapy back in 2003, seemed to give her good relief. Patient does have 15 month old child. Increased pain while pregnant. ** Urinary changes due to pregnancy, no changes in bowel patterns. She has occasional pain in abdomen with toileting. She will be getting CT scan tomorrow. Hx from chart review: consultation regarding her chronic LBP. She states she was diagnosed with arthritis in her lower back in 2003. She has intermittent episodes of low back pain which occur every 3 to 4 months. Symptoms tend to last for approximately 1 week. At the present time, she is not having pain in her lower back. She rates her back pain when she is experiencing pain 8/10 VAS pain is in the midline of her lower back and does radiate to her left and right SI joint region/buttocks. She denies leg pain or radicular symptoms. She describes the pain as dull ache, throbbing discomfort. She states pain is worse with forward flexion and extension. She denies numbness and tingling into the lower extremities. She denies weakness of her left or right leg. She denies bowel or bladder dysfunction and saddle anesthesia. She states she is experiencing pain, she can sit for a maximum of 10 minutes and stand for a maximum 10 minutes. The pain can interrupt her sleep. She has tried ibuprofen in the past.  After several days it does seem to help her pain. She has been to therapy years ago but does not recall the exercise program.    Relevant Medical History: OA  Functional Disability Index: FOTO 6/20 31% Disability    Current pain:  0/10  Pain at worst:  3/10    Easing factors:  Advil, rest, ice and heat  Provocative factors: tough workouts, sleeping in odd positions, bending forwards for extended periods at work, standing straight, sitting for extended duration, stairs (ascending)      Type: []Constant   [x]Intermittent []Radiating []Localized []other:    Characteristics: sharp with flare up, dull     Numbness/Tingling: pt denies    Functional Limitations/Impairments: [x]Sitting [x]Standing []Walking    [x]Squatting/bending  [x]Stairs           []ADL's  [x]Transfers []Sports/Recreation []Other:    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      OBJECTIVE:     Standing Exam Normal Abnormal N/A Comments   Toe walk         Heel Walk       Side bending X but painful       Pelvic Height       Fwd Bend- (aberrant juttering or innominate mvmt) x      Extension  75% limitation and painful      Trendelenburg       Kemps/Quadrant       Stork       Rotation x             Repeated movements       Seated Exam Normal Abnormal N/A Comments   Pelvic Height       Seated Rotation       Seated flexion       B hip IR                     Supine Exam Normal Abnormal N/A Comments   Hip flexion x      Abduction       ER x      IR x      TWAN/Tien       Hip scour       SLR       Crossed SLR       Supine to sit       SI distraction       Thigh thrust       Side-lying SI compression              Prone Exam Normal Abnormal N/A Comments   Prone knee bend       Prone hip IR       B Achilles reflex/Pheasant       PA/Spring       Prone Instability test       Sacral Spring/thrust                       ROM LEFT RIGHT Comments   Hip Flexion      Hip Abd      Hip ER      Hip IR      Hip Extension      Knee Ext      Knee Flex      Piriformis      Hamstring   90/90 position                 Strength LEFT RIGHT Comments   Multifidus      Transverse Ab      Hip Flexors 5 5    Hip Abductors 4- 4    Hip Extensors      Knee flex 5 5    Knee extn 5 5       Myotomes Normal Abnormal Comments   Hip flexion (L1-L2)      Knee extension (L2-L4)      Dorsiflexion (L4-L5)      Great Toe Ext (L5)      Ankle Eversion (S1-S2)      Ankle PF(S1-S2)          Dermatomes Normal Abnormal Comments   inguinal area (L1) anterior mid-thigh (L2)      distal ant thigh/med knee (L3)      medial lower leg and foot (L4)      lateral lower leg and foot (L5)      posterior calf (S1)      medial calcaneus (S2)        Neural dynamic tension testing Normal Abnormal Comments   Slump Test  - Degree of knee flexion:       SLR       0-30      30-70      Femoral nerve (L2-4)        Reflexes Normal Abnormal Comments   S1-2 Seated achilles      S1-2 Prone knee bend      L3-4 Patellar tendon      C5-6 Biceps      C6 Brachioradialis      C7-8 Triceps      Clonus      Babinski      Yang's        Joint mobility:    [x]Normal    []Hypo   []Hyper    Palpation: (+) TTP L QL, PSIS bilaterally    Functional Mobility/Transfers: independent     Posture:  WNL    Gait: (include devices/WB status) trendelenburg bilaterally    Bandages/Dressings/Incisions: n/a      TA Muscle Contraction Scale    Criteria Score  Quality of Contraction   Not Present [] 0   Rapid, Superificial [] 1   Just Perceptible [] 2     Gentle, Slow [] 3    Substitution   Resting  [] 0   Moderate to Strong [] 1    Subtle Perceptible [] 2   None [] 3    Symmetry of Contraction   Unilateral  [] 0   Bilateral/Asymmetrical  [] 1   Symmetrical  [] 2    Breathing     Inability/Difficulty Breathing during contraction [] 0   Able to hold contraction while Breathing [] 1    Holding   Able to Hold Contraction <10 s [] 0   Able to Hold Contraction >10 s [] 1      __/10  Adapted from Juan Cancino al, Copyright 2009      Jessica Criteria - SI Component (2/5 for positive)   []Distraction  []Thigh Thrust  []Gaenslen's test  []Compression  []Sacral thrust     [place] Criteria - Pelvic component  (3/4 for positive or 2/4 + Estelle's Sign)   []Standing Flexion test  []Seated Pelvic Landmarks   []Supine Long Sit Test  []Prone Knee Flexion Test  []Estelle's Sign       Classification driving today's treatment approach and dosing:  - Prevailing signs and symptoms consistent with:  []Symptom Modulation Approach   [] \"Directional preference subgroup\"    []Flexion Based      []age >50      []imaging evidence of lumbar stenosis     []preference for flexion     []Extension Based     []symptoms distal to buttock     []symptoms centralize with lumbar extension     []symptoms peripheralize with lumbar flexion     []directional preference for extension    []Lateral Shift     []visible frontal plane deviation     []directional preference for lateral translation   [] \"Manipulation subgroup\"  (3/4 meets manipulation criteria)     []symptoms less than 16 days    []FABQ less than 19    []Hypomobility of lumbar spine    []IR of at least 1 hip >35 degrees    Other Factors to consider:    []no symptoms distal to the knee    []no red flags and minimal yellow flags    []no contraindications to manipulation   [] \"Traction subgroup\"      []signs and symptoms of nerve root compression (radiculopathy)     []unable to centralize distal symptoms with movement    []pain or numbness in the lumbar spine, buttock, and/or LE    []peripheralization with extension movements    []+ SLR or + crossed SLR (symptoms less than 70 degrees)  []Movement Control Approach   []\"Stabilization subgroup\"    []younger age (less than 36)     []greater general flexibility (post-partum, SLR >90)    []abberant movements, painful arc, or [place]'s sign with flex/ext ROM    []positive prone instability test  []Functional Optimization Approach   []\"unspecified subgroup\"    []lower disability scores     []lower fear avoidance scores     []longer duration of symptoms (chronic)    []prior episodes of low back pain    []older age                         [x]Patient history, allergies, meds reviewed. Medical chart reviewed. See intake form. Review Of Systems (ROS):  [x]Performed Review of systems (Integumentary, CardioPulmonary, Neurological) by intake and observation. Intake form has been scanned into medical record.  Patient has been instructed to contact their primary care physician regarding ROS issues if not already being addressed at this time. Co-morbidities/Complexities (which will affect course of rehabilitation):   []None           Arthritic conditions   []Rheumatoid arthritis (M05.9)  [x]Osteoarthritis (M19.91)   Cardiovascular conditions   []Hypertension (I10)  []Hyperlipidemia (E78.5)  []Angina pectoris (I20)  []Atherosclerosis (I70)   Musculoskeletal conditions   []Disc pathology   []Congenital spine pathologies   []Prior surgical intervention  []Osteoporosis (M81.8)  []Osteopenia (M85.8)   Endocrine conditions   []Hypothyroid (E03.9)  []Hyperthyroid Gastrointestinal conditions   []Constipation (Y50.23)   Metabolic conditions   []Morbid obesity (E66.01)  []Diabetes type 1(E10.65) or 2 (E11.65)   []Neuropathy (G60.9)     Pulmonary conditions   []Asthma (J45)  []Coughing   []COPD (J44.9)   Psychological Disorders  []Anxiety (F41.9)  []Depression (F32.9)   []Other:   []Other:          Barriers to/and or personal factors that will affect rehab potential:              []Age  []Sex              []Motivation/Lack of Motivation                        [x]Co-Morbidities              []Cognitive Function, education/learning barriers              []Environmental, home barriers              []profession/work barriers  []past PT/medical experience  [x]other: chronicity of symptoms   Justification:     Falls Risk Assessment (30 days):   [x] Falls Risk assessed and no intervention required.   [] Falls Risk assessed and Patient requires intervention due to being higher risk   TUG score (>12s at risk):     [] Falls education provided, including         ASSESSMENT:     Functional Impairments:     []Noted lumbar/proximal hip hypomobility   []Noted lumbosacral and/or generalized hypermobility   []Decreased Lumbosacral/hip/LE functional ROM   [x]Decreased core/proximal hip strength and neuromuscular control    []Decreased LE functional strength    []Abnormal reflexes/sensation/myotomal/dermatomal deficits  []Reduced balance/proprioceptive control    []other:      Functional Activity Limitations (from functional questionnaire and intake)   [x]Reduced ability to tolerate prolonged functional positions   []Reduced ability or difficulty with changes of positions or transfers between positions   []Reduced ability to maintain good posture and demonstrate good body mechanics with sitting, bending, and lifting   [x]Reduced ability to sleep   [x] Reduced ability or tolerance with driving and/or computer work   [x]Reduced ability to perform lifting, reaching, carrying tasks   []Reduced ability to squat   [x]Reduced ability to forward bend   [x]Reduced ability to ambulate prolonged functional periods/distances/surfaces   [x]Reduced ability to ascend/descend stairs   []other:       Participation Restrictions   []Reduced participation in self care activities   [x]Reduced participation in home management activities   [x]Reduced participation in work activities   []Reduced participation in social activities. []Reduced participation in sport/recreation activities. Classification:   [x]Signs/symptoms consistent with Lumbar instability/stabilization subgroup. []Signs/symptoms consistent with Lumbar mobilization/manipulation subgroup, myotomes and dermatomes intact. Meets manipulation criteria. []Signs/symptoms consistent with Lumbar direction specific/centralization subgroup   []Signs/symptoms consistent with Lumbar traction subgroup     []Signs/symptoms consistent with lumbar facet dysfunction   []Signs/symptoms consistent with lumbar stenosis type dysfunction   []Signs/symptoms consistent with nerve root involvement including myotome & dermatome dysfunction   []Signs/symptoms consistent with post-surgical status including: decreased ROM, strength and function.    []signs/symptoms consistent with pathology which may benefit from Dry needling     []other:      Prognosis/Rehab Potential:      []Excellent   [x]Good    []Fair   []Poor    Tolerance of evaluation/treatment:    []Excellent   [x]Good    []Fair   []Poor    PLAN:     Frequency/Duration:  1 days per week for 6 Weeks:  Interventions:  [x]  Therapeutic exercise including: strength training, ROM, for LE, Glutes and core   [x]  NMR activation and proprioception for glutes , LE and Core   [x]  Manual therapy as indicated for Hip complex, LE and spine to include: Dry Needling/IASTM, STM, PROM, Gr I-IV mobilizations, manipulation. [x]  Modalities as needed that may include: thermal agents, E-stim, Biofeedback, US, iontophoresis as indicated  [x]  Patient education on joint protection, postural re-education, activity modification, progression of HEP. HEP instruction:   Access Code: [email or web address]  URL: [email or web address]  Date: 06/20/2022  Prepared by: Katharina Rasmussen  (see scanned forms)    GOALS:  Patient stated goal: lessen pain flare ups    Therapist goals for Patient:     Short Term Goals: To be achieved in: 2 weeks  1. Independent in HEP and progression per patient tolerance, in order to prevent re-injury. [] Progressing: [] Met: [] Not Met: [] Adjusted   2. Patient will have a decrease in pain to facilitate improvement in movement, function, and ADLs as indicated by Functional Deficits. [] Progressing: [] Met: [] Not Met: [] Adjusted    Long Term Goals: To be achieved in: 6 weeks  1. Disability index score of 24% or less for the FOTO to assist with reaching prior level of function. [] Progressing: [] Met: [] Not Met: [] Adjusted  2. Patient will demonstrate increased lumbar extension AROM to less than or equal to 50% limitation and pain free to allow for proper joint functioning as indicated by patients Functional Deficits. [] Progressing: [] Met: [] Not Met: [] Adjusted  3.  Patient will demonstrate an increase in abduction strength to > or = to 4+/5 to allow for proper functional mobility as indicated by patients Functional Deficits. [] Progressing: [] Met: [] Not Met: [] Adjusted  4. Patient will complete one flight of stair negotiation without increased symptoms or restriction. [] Progressing: [] Met: [] Not Met: [] Adjusted  5. Patient will be able to ambulate 1 miles without increased symptoms or restriction     [] Progressing: [] Met: [] Not Met: [] Adjusted       Physical Therapy Evaluation Complexity Justification  [x] A history of present problem with:  [x] no personal factors and/or comorbidities that impact the plan of care;  []1-2 personal factors and/or comorbidities that impact the plan of care  []3 personal factors and/or comorbidities that impact the plan of care  [x] An examination of body systems using standardized tests and measures addressing any of the following: body structures and functions (impairments), activity limitations, and/or participation restrictions;:  [] a total of 1-2 or more elements   [x] a total of 3 or more elements   [] a total of 4 or more elements   [x] A clinical presentation with:  [x] stable and/or uncomplicated characteristics   [] evolving clinical presentation with changing characteristics  [] unstable and unpredictable characteristics;   [x] Clinical decision making of [x] low, [] moderate, [] high complexity using standardized patient assessment instrument and/or measurable assessment of functional outcome.     [x] EVAL (LOW) 92603 (typically 20 minutes face-to-face)  [] EVAL (MOD) 75721 (typically 30 minutes face-to-face)  [] EVAL (HIGH) 21892 (typically 45 minutes face-to-face)  [] RE-EVAL 25691     Electronically signed by:  Nurys Melton, PT, DPT

## 2022-06-21 ENCOUNTER — HOSPITAL ENCOUNTER (OUTPATIENT)
Dept: CT IMAGING | Age: 37
Discharge: HOME | End: 2022-06-21
Payer: MEDICARE

## 2022-06-21 DIAGNOSIS — R10.32 LLQ PAIN: ICD-10-CM

## 2022-06-21 PROCEDURE — 74177 CT ABD & PELVIS W/CONTRAST: CPT

## 2022-06-21 PROCEDURE — 6360000004 HC RX CONTRAST MEDICATION: Performed by: NURSE PRACTITIONER

## 2022-06-21 RX ADMIN — IOHEXOL 50 ML: 240 INJECTION, SOLUTION INTRATHECAL; INTRAVASCULAR; INTRAVENOUS; ORAL at 07:08

## 2022-06-21 RX ADMIN — IOPAMIDOL 75 ML: 755 INJECTION, SOLUTION INTRAVENOUS at 07:08

## 2022-06-29 ENCOUNTER — HOSPITAL ENCOUNTER (OUTPATIENT)
Dept: PHYSICAL THERAPY | Age: 37
Setting detail: THERAPIES SERIES
Discharge: HOME | End: 2022-06-29
Payer: MEDICARE

## 2022-06-29 PROCEDURE — 97112 NEUROMUSCULAR REEDUCATION: CPT

## 2022-06-29 PROCEDURE — 97530 THERAPEUTIC ACTIVITIES: CPT

## 2022-06-29 PROCEDURE — 97110 THERAPEUTIC EXERCISES: CPT

## 2022-06-29 NOTE — FLOWSHEET NOTE
Kemps/Quadrant           Stork           Rotation x                     Repeated movements                    Strength LEFT RIGHT Comments   Multifidus         Transverse Ab         Hip Flexors 5 5     Hip Abductors 4- 4     Hip Extensors         Knee flex 5 5     Knee extn 5 5            RESTRICTIONS/PRECAUTIONS: none reported    Exercises/Interventions:   Exercise Resistance/Repetitions Other comments   Stretching:      Hamstring stretch  EOT   Piriformis stretch- figure 4 4x20\"    Knee to opposite shoulder     LTR     Open book     SKTC     Child's pose     Prone prop on elbow     Prone extn to outstretched hands     Standing lumbar extn     Seated 3 way SB rollout flexion           Seated forward flexion     Cat/camel 15x5\"    Hip flexor stretch half kneeling     Ranulfo stretch          Neural mobilization:     Sciatic nerve glides          Strengthening/stabilization:     Posterior pelvic tilts 15x5\"    TrA contraction     TrA with ball squeeze 10x10\"    TrA with abd iso 10x10\"    TrA contraction with alt marches x15    TrA with alt 90/90 heel taps x15         Prone alt UE lift     Prone alt UE/LE lift     Prone towel squeeze with B leg lift   Pillow under hips to avoid lumbar hyperextn   Prone heel clicks     Prone leaning over edge of table hip extn     Supine B hip abd with TB     clams     Standing clam with back foot against wall     Bridge- DL 3x10    [facility]     SL bridge     Bridge on [facility] on [place]     Clamshells  2x10 B    Quadruped position with knee on airex with opposite hip lift     Bird/dog     Quadruped with hip abd     Quadruped with hip circles     Quadruped with foot against ball on wall     Opposite UE to LE isometric core     Leg drop hold     [place] System with glute set     Half kneeling on airex glute set + stabilization exercise          Hip abduction isometric into wall standing     Tandem stance + stabilization exercise     SLS 3x20\" on airex [place] LE at 90   High march with opposite hip stabilization standing     TrA/mutlifidi walk outs     TrA/multifidi pallof press Blue x15 B    TB ext + march Blue 2x10         Scapular retraction     Seated SB marches          Squats      Leg press     Lateral band walks     SB pikes     SB knee tucks          Planks      Side planks 4x20\" from knees         Frozen Bears               Manual treatment:                                Patient education: Pt was educated on PT diagnosis, prognosis, and plan of care. Pt was educated on the use of ice throughout the day. Therapeutic Exercise and NMR EXR  [x] (37202) Provided verbal/tactile cueing for activities related to strengthening, flexibility, endurance, ROM  for improvements in proximal hip and core control with self care, mobility, lifting and ambulation.  [] (63464) Provided verbal/tactile cueing for activities related to improving balance, coordination, kinesthetic sense, posture, motor skill, proprioception  to assist with core control in self care, mobility, lifting, and ambulation.      Therapeutic Activities:    [x] (67913 or 28608) Provided verbal/tactile cueing for activities related to improving balance, coordination, kinesthetic sense, posture, motor skill, proprioception and motor activation to allow for proper function  with self care and ADLs  [] (26281) Provided training and instruction to the patient for proper core and proximal hip recruitment and positioning with ambulation re-education     Home Exercise Program:    [x] (18885) Reviewed/Progressed HEP activities related to strengthening, flexibility, endurance, ROM of core, proximal hip and LE for functional self-care, mobility, lifting and ambulation   [] (88564) Reviewed/Progressed HEP activities related to improving balance, coordination, kinesthetic sense, posture, motor skill, proprioception of core, proximal hip and LE for self care, mobility, lifting, and ambulation      Manual Treatments:  PROM / STM / Oscillations-Mobs:  G-I, II, III, IV (PA's, Inf., Post.)  [] (41052) Provided manual therapy to mobilize proximal hip and LS spine soft tissue/joints for the purpose of modulating pain, promoting relaxation,  increasing ROM, reducing/eliminating soft tissue swelling/inflammation/restriction, improving soft tissue extensibility and allowing for proper ROM for normal function with self care, mobility, lifting and ambulation. Modalities:       Charges:  Timed Code Treatment Minutes: 40'   Total Treatment Minutes: 44'   700-744   [] EVAL (LOW) 08077   [] EVAL (MOD) 91708   [] EVAL (HIGH) 16340   [] RE-EVAL   [x] SP(03669) x  1   [] IONTO  [x] NMR (19270) x  1   [] VASO  [] Manual (24187) x      [] Other:  [x] TA x   1   [] Mech Traction (61678)  [] ES(attended) (45383)      [] ES (un) (29004):       HEP instruction:   Access Code: [email or web address]  URL: [email or web address]  Date: 06/29/2022  Prepared by: Oliver Camacho  (see scanned forms)    GOALS:  Patient stated goal: lessen pain flare ups    Therapist goals for Patient:     Short Term Goals: To be achieved in: 2 weeks  1. Independent in HEP and progression per patient tolerance, in order to prevent re-injury. [] Progressing: [] Met: [] Not Met: [] Adjusted   2. Patient will have a decrease in pain to facilitate improvement in movement, function, and ADLs as indicated by Functional Deficits. [] Progressing: [] Met: [] Not Met: [] Adjusted    Long Term Goals: To be achieved in: 6 weeks  1. Disability index score of 24% or less for the FOTO to assist with reaching prior level of function. [] Progressing: [] Met: [] Not Met: [] Adjusted  2. Patient will demonstrate increased lumbar extension AROM to less than or equal to 50% limitation and pain free to allow for proper joint functioning as indicated by patients Functional Deficits. [] Progressing: [] Met: [] Not Met: [] Adjusted  3.  Patient will demonstrate an increase in abduction strength to > or = to 4+/5 to allow for proper functional mobility as indicated by patients Functional Deficits. [] Progressing: [] Met: [] Not Met: [] Adjusted  4. Patient will complete one flight of stair negotiation without increased symptoms or restriction. [] Progressing: [] Met: [] Not Met: [] Adjusted  5. Patient will be able to ambulate 1 miles without increased symptoms or restriction     [] Progressing: [] Met: [] Not Met: [] Adjusted        Overall Progression Towards Functional goals/ Treatment Progress Update:  [] Patient is progressing as expected towards functional goals listed. [] Progression is slowed due to complexities/Impairments listed. [] Progression has been slowed due to co-morbidities. [x] Plan just implemented, too soon to assess goals progression <30days   [] Goals require adjustment due to lack of progress  [] Patient is not progressing as expected and requires additional follow up with physician  [] Other    Prognosis for POC: [x] Good [] Fair  [] Poor      Patient requires continued skilled intervention: [x] Yes  [] No      ASSESSMENT:  See eval    Treatment/Activity Tolerance:  [x] Patient tolerated treatment well [] Patient limited by fatique  [] Patient limited by pain  [] Patient limited by other medical complications  [x] Other: Patient tolerated session well without increases in low back pain. Patient challenged with theraband pallof press to prevent rotation with movement. Patient given updated HEP. Will continue to progress mobility and functional strength as tolerated. PLAN: See eval  [] Continue per plan of care [] Alter current plan (see comments above)  [x] Plan of care initiated [] Hold pending MD visit [] Discharge    Note: If patient does not return for scheduled/ recommended follow up visits, this note will serve as a discharge from care along with most recent update on progress.      Electronically signed by: Nataly Cervantes, PT, DPT

## 2022-07-06 ENCOUNTER — HOSPITAL ENCOUNTER (OUTPATIENT)
Dept: PHYSICAL THERAPY | Age: 37
Setting detail: THERAPIES SERIES
Discharge: HOME | End: 2022-07-06
Payer: MEDICARE

## 2022-07-06 PROCEDURE — 97110 THERAPEUTIC EXERCISES: CPT

## 2022-07-06 PROCEDURE — 97530 THERAPEUTIC ACTIVITIES: CPT

## 2022-07-06 NOTE — PLAN OF CARE
[address]    Physical Therapy Re-Certification Plan of Care/MD UPDATE      Dear  DIXON Tijerina,    We had the pleasure of treating the following patient for physical therapy services at [address] [place] [address]. A summary of our findings can be found in the updated assessment below. This includes our plan of care. If you have any questions or concerns regarding these findings, please do not hesitate to contact me at the office phone number checked above. Thank you for the referral.     Physician Signature:________________________________Date:__________________  By signing above (or electronic signature), therapists plan is approved by physician    Date Range Of Visits: -  Total Visits to Date: 3  Overall Response to Treatment:   [x]Patient is responding well to treatment and improvement is noted with regards  to goals   []Patient should continue to improve in reasonable time if they continue HEP   []Patient has plateaued and is no longer responding to skilled PT intervention    []Patient is getting worse and would benefit from return to referring MD   []Patient unable to adhere to initial POC   [x]Other: Patient has made good progress through this period of skilled physical therapy in regards to pain reduction, range of motion, strength, and functional activity tolerance. Patient has met goals established for this POC, and has good understanding of HEP. As the patient does not feel limited with ADL completion, she is agreeable to transition to HEP at this time. Patient has been educated to return to physical therapy if questions or pain arises.                                                             Physical Therapy Daily Treatment Note  Date:  2022    Patient Name:  Bridgette Escobar    :  1985  MRN: [id number]    Medical/Treatment Diagnosis Information:  · Diagnosis: M53.3 (ICD-10-CM`) - SI (sacroiliac) joint [id number] (ICD-10-CM) - Lumbar facet arthropathy  Treatment Diagnosis: M54.50  Low back pain, unspecified  Insurance/Certification information:  PT Insurance Information: [place]  Physician Information:  Referring Provider (secondary): DIXON De Souza  Has the plan of care been signed (Y/N):        []  Yes  [x]  No     Date of Patient follow up with Physician: PRN      Is this a Progress Report:     []  Yes  [x]  No        If Yes:  Date Range for reporting period:  Beginning- 6/20/2022   Ending    Progress report will be due (10 Rx or 30 days whichever is less):  7/84      Recertification will be due (POC Duration  / 90 days whichever is less): as above        Visit # Insurance Allowable Auth Required   3 30 VPCY []  Yes [x]  No        Functional Scale:     OUTCOME MEASURE DATE DEFICIT   FOTO 7/6 25% Deficit    FOTO 6/20 IE 31% Deficit         Latex Allergy:  [x]NO      []YES  Preferred Language for Healthcare:   [x]English       []other:      Pain level:  0/10  on 7/6/2022    SUBJECTIVE:  Patient states her back has been feeling really good overall. Her back has not affected her throughout ADLs.         OBJECTIVE:     See eval  Standing Exam Normal Abnormal N/A Comments   Toe walk             Heel Walk           Side bending X          Pelvic Height           Fwd Bend- (aberrant juttering or innominate mvmt) x         Extension  25%  limitation but sore         Trendelenburg           Kemps/Quadrant           Stork           Rotation x                     Repeated movements                    Strength LEFT RIGHT Comments   Multifidus         Transverse Ab         Hip Flexors 5 5     Hip Abductors 4+ 5     Hip Extensors         Knee flex 5 5     Knee extn 5 5            RESTRICTIONS/PRECAUTIONS: none reported    Exercises/Interventions:   Exercise Resistance/Repetitions Other comments   Stretching:      Hamstring stretch  EOT Piriformis stretch- figure 4 4x20\"    Knee to opposite shoulder     LTR     Open book     [address]     Child's pose     Prone prop on elbow     Prone extn to outstretched hands     Standing lumbar extn     Seated 3 way SB rollout flexion           Seated forward flexion     Cat/camel 15x5\"    Hip flexor stretch half kneeling     Ranulfo stretch          Neural mobilization:     Sciatic nerve glides          Strengthening/stabilization:     Posterior pelvic tilts    TrA contraction    TrA with ball squeeze    TrA with abd iso    TrA contraction with alt marches    TrA with alt 90/90 heel taps         Prone alt UE lift     Prone alt UE/LE lift     Prone towel squeeze with B leg lift   Pillow under hips to avoid lumbar hyperextn   Prone heel clicks     Prone leaning over edge of table hip extn     Supine B hip abd with TB     clams     Standing clam with back foot against wall     Bridge- DL     [place] bridge     SL bridge     Bridge on [facility] on [facility]  2x10 B green    Quadruped position with knee on airex with opposite hip lift     Bird/dog     Quadruped with hip abd     Quadruped with hip circles     Quadruped with foot against ball on wall     Opposite UE to LE isometric core     Leg drop hold     [place] System with glute set     Half kneeling on airex glute set + stabilization exercise          Hip abduction isometric into wall standing     Tandem stance + stabilization exercise     SLS [place] LE at 90   High march with opposite hip stabilization standing     TrA/mutlifidi walk outs     TrA/multifidi pallof press Blue SL x10 B    TB ext + march     TrA/multifidi stir the pot Blue 2x10 cw / ccw    Scapular retraction     Seated SB marches          Squats      Leg press     Lateral band walks Green at knees  2 laps     SB pikes     SB knee tucks          Planks      Side planks          Frozen Bears               Manual treatment:                                Patient education: Pt was educated on PT diagnosis, prognosis, and plan of care. Pt was educated on the use of ice throughout the day. Therapeutic Exercise and NMR EXR  [x] (55675) Provided verbal/tactile cueing for activities related to strengthening, flexibility, endurance, ROM  for improvements in proximal hip and core control with self care, mobility, lifting and ambulation.  [] (36512) Provided verbal/tactile cueing for activities related to improving balance, coordination, kinesthetic sense, posture, motor skill, proprioception  to assist with core control in self care, mobility, lifting, and ambulation. Therapeutic Activities:    [x] (92414 or 15906) Provided verbal/tactile cueing for activities related to improving balance, coordination, kinesthetic sense, posture, motor skill, proprioception and motor activation to allow for proper function  with self care and ADLs  [] (10715) Provided training and instruction to the patient for proper core and proximal hip recruitment and positioning with ambulation re-education     Home Exercise Program:    [x] (71873) Reviewed/Progressed HEP activities related to strengthening, flexibility, endurance, ROM of core, proximal hip and LE for functional self-care, mobility, lifting and ambulation   [] (09114) Reviewed/Progressed HEP activities related to improving balance, coordination, kinesthetic sense, posture, motor skill, proprioception of core, proximal hip and LE for self care, mobility, lifting, and ambulation      Manual Treatments:  PROM / STM / Oscillations-Mobs:  G-I, II, III, IV (PA's, Inf., Post.)  [] (32606) Provided manual therapy to mobilize proximal hip and LS spine soft tissue/joints for the purpose of modulating pain, promoting relaxation,  increasing ROM, reducing/eliminating soft tissue swelling/inflammation/restriction, improving soft tissue extensibility and allowing for proper ROM for normal function with self care, mobility, lifting and ambulation.      Modalities: Charges:  Timed Code Treatment Minutes: 29'   Total Treatment Minutes: 29'   700-729    [] EVAL (LOW) 57837   [] EVAL (MOD) 99126   [] EVAL (HIGH) 37486   [] RE-EVAL   [x] GARCIA(38099) x  1   [] IONTO  [] NMR (01554) x     [] VASO  [] Manual (31625) x      [] Other:  [x] TA x   1   [] Mech Traction (92165)  [] ES(attended) (47574)      [] ES (un) (77692):       HEP instruction:   Access Code: [email or web address]  URL: [email or web address]  Date: 07/06/2022  Prepared by: Fay Cabrales  (see scanned forms)    GOALS:  Patient stated goal: lessen pain flare ups    Therapist goals for Patient:     Short Term Goals: To be achieved in: 2 weeks  1. Independent in HEP and progression per patient tolerance, in order to prevent re-injury. [] Progressing: [x] Met: [] Not Met: [] Adjusted   2. Patient will have a decrease in pain to facilitate improvement in movement, function, and ADLs as indicated by Functional Deficits. [] Progressing: [x] Met: [] Not Met: [] Adjusted    Long Term Goals: To be achieved in: 6 weeks   1. Disability index score of 24% or less for the FOTO to assist with reaching prior level of function. [x] Progressing: [] Met: [] Not Met: [] Adjusted  2. Patient will demonstrate increased lumbar extension AROM to less than or equal to 50% limitation and pain free to allow for proper joint functioning as indicated by patients Functional Deficits. [] Progressing: [x] Met: [] Not Met: [] Adjusted  3. Patient will demonstrate an increase in abduction strength to > or = to 4+/5 to allow for proper functional mobility as indicated by patients Functional Deficits. [] Progressing: [x] Met: [] Not Met: [] Adjusted  4. Patient will complete one flight of stair negotiation without increased symptoms or restriction. [] Progressing: [x] Met: [] Not Met: [] Adjusted  5.  Patient will be able to ambulate 1 miles without increased symptoms or restriction     [] Progressing: [x] Met: [] Not Met: [] Adjusted        Overall Progression Towards Functional goals/ Treatment Progress Update:  [x] Patient is progressing as expected towards functional goals listed. [] Progression is slowed due to complexities/Impairments listed. [] Progression has been slowed due to co-morbidities. [] Plan just implemented, too soon to assess goals progression <30days   [] Goals require adjustment due to lack of progress  [] Patient is not progressing as expected and requires additional follow up with physician  [] Other    Prognosis for POC: [x] Good [] Fair  [] Poor      Patient requires continued skilled intervention: [x] Yes  [] No      ASSESSMENT:  See eval    Treatment/Activity Tolerance:  [x] Patient tolerated treatment well [] Patient limited by fatique  [] Patient limited by pain  [] Patient limited by other medical complications  [] Other:      PLAN: Return PRN  [x] Continue per plan of care [] Alter current plan (see comments above)  [] Plan of care initiated [] Hold pending MD visit [] Discharge    Note: If patient does not return for scheduled/ recommended follow up visits, this note will serve as a discharge from care along with most recent update on progress.      Electronically signed by: Cory Escalante, PT, DPT

## 2022-08-29 ENCOUNTER — TELEPHONE (OUTPATIENT)
Dept: SURGERY | Age: 37
End: 2022-08-29

## 2022-08-29 NOTE — TELEPHONE ENCOUNTER
Message left for Madison Bauer- her surgery itself is scheduled for two hours. I told her in the message I would call her Tuesday as I am leaving early today.

## 2022-09-02 PROBLEM — Z85.828 HISTORY OF BASAL CELL CARCINOMA (BCC): Status: RESOLVED | Noted: 2021-06-16 | Resolved: 2022-09-02

## 2022-09-02 PROBLEM — S52.201A CLOSED FRACTURE OF SHAFT OF RIGHT ULNA: Status: RESOLVED | Noted: 2019-06-03 | Resolved: 2022-09-02

## 2022-09-08 NOTE — PROGRESS NOTES
[address] [facility] time ________0925____        Surgery time _____1055_______    Take the following medications with a sip of water: Follow your MD/Surgeons pre-procedure instructions regarding your medications     Do not eat or drink anything after 12:00 midnight prior to your surgery. This includes water chewing gum, mints and ice chips. You may brush your teeth and gargle the morning of your surgery, but do not swallow the water     Please see your family doctor/pediatrician for a history and physical and/or concerning medications. Bring any test results/reports from your physicians office. If you are under the care of a heart doctor or specialist doctor, please be aware that you may be asked to them for clearance    You may be asked to stop blood thinners such as Coumadin, Plavix, Fragmin, Lovenox, etc., or any anti-inflammatories such as:  Aspirin, Ibuprofen, Advil, Naproxen prior to your surgery. We also ask that you stop any OTC medications such as fish oil, vitamin E, glucosamine, garlic, Multivitamins, COQ 10, etc.    We ask that you do not smoke 24 hours prior to surgery  We ask that you do not  drink any alcoholic beverages 24 hours prior to surgery     You must make arrangements for a responsible adult to take you home after your surgery. For your safety you will not be allowed to leave alone or drive yourself home. Your surgery will be cancelled if you do not have a ride home. Also for your safety, it is strongly suggested that someone stay with you the first 24 hours after your surgery. A parent or legal guardian must accompany a child scheduled for surgery and plan to stay at the hospital until the child is discharged. Please do not bring other children with you. For your comfort, please wear simple loose fitting clothing to the hospital.  Please do not bring valuables.     Do not wear any make-up or nail polish on your fingers or toes      For your safety, please do not wear any jewelry or body piercing's on the day of surgery. All jewelry must be removed. If you have dentures, they will be removed before going to operating room. For your convenience, we will provide you with a container. If you wear contact lenses or glasses, they will be removed, please bring a case for them. If you have a living will and a durable power of  for healthcare, please bring in a copy. As part of our patient safety program to minimize surgical site infections, we ask you to do the following:    Please notify your surgeon if you develop any illness between         now and the  day of your surgery. This includes a cough, cold, fever, sore throat, nausea,         or vomiting, and diarrhea, etc.   Please notify your surgeon if you experience dizziness, shortness         of breath or blurred vision between now and the time of your surgery. Do not shave your operative site 96 hours prior to surgery. For face and neck surgery, men may use an electric razor 48 hours   prior to surgery. You may shower the night before surgery or the morning of   your surgery with an antibacterial soap. You will need to bring a photo ID and insurance card    [facility] has an onsite pharmacy, would you like to utilize our pharmacy     If you will be staying overnight and use a C-pap machine, please bring   your C-pap to hospital     Our goal is to provide you with excellent care, therefore, visitors will be limited to two(2) in the room at a time so that we may focus on providing this care for you. Please contact pre-admission testing if you have any further questions. [facility] phone number:  [phone] PAT fax number:  [phone]  Please note these are generalized instructions for all surgical cases, you may be provided with more specific instructions according to your surgery.     C-Difficile admission screening and protocol:       * Admitted with diarrhea? [] YES    [x]  NO     *Prior history of C-Diff. In last 3 months? [] YES    [x]  NO     *Antibiotic use in the past 6-8 weeks? [x]  NO    []  YES                 If yes, which ANTIBIOTIC AND REASON______     *Prior hospitalization or nursing home in the last month? []  YES    [x]  NO        SAFETY FIRST. .call before you fall

## 2022-09-13 ENCOUNTER — ANESTHESIA EVENT (OUTPATIENT)
Dept: OPERATING ROOM | Age: 37
End: 2022-09-13
Payer: MEDICARE

## 2022-09-14 ENCOUNTER — ANESTHESIA (OUTPATIENT)
Dept: OPERATING ROOM | Age: 37
End: 2022-09-14
Payer: MEDICARE

## 2022-09-14 ENCOUNTER — HOSPITAL ENCOUNTER (OUTPATIENT)
Age: 37
Setting detail: OUTPATIENT SURGERY
Discharge: HOME | End: 2022-09-14
Attending: SURGERY | Admitting: SURGERY
Payer: MEDICARE

## 2022-09-14 VITALS
TEMPERATURE: 98 F | RESPIRATION RATE: 12 BRPM | WEIGHT: 118 LBS | HEIGHT: 66 IN | SYSTOLIC BLOOD PRESSURE: 120 MMHG | DIASTOLIC BLOOD PRESSURE: 70 MMHG | OXYGEN SATURATION: 99 % | BODY MASS INDEX: 18.96 KG/M2 | HEART RATE: 62 BPM

## 2022-09-14 DIAGNOSIS — I83.813 VARICOSE VEINS OF BOTH LOWER EXTREMITIES WITH PAIN: ICD-10-CM

## 2022-09-14 DIAGNOSIS — M79.605 PAIN IN BOTH LOWER EXTREMITIES: Primary | ICD-10-CM

## 2022-09-14 DIAGNOSIS — M79.604 PAIN IN BOTH LOWER EXTREMITIES: Primary | ICD-10-CM

## 2022-09-14 DIAGNOSIS — G89.18 ACUTE POST-OPERATIVE PAIN: ICD-10-CM

## 2022-09-14 LAB — PREGNANCY, URINE: NEGATIVE

## 2022-09-14 PROCEDURE — 6360000002 HC RX W HCPCS: Performed by: SURGERY

## 2022-09-14 PROCEDURE — 6360000002 HC RX W HCPCS: Performed by: NURSE ANESTHETIST, CERTIFIED REGISTERED

## 2022-09-14 PROCEDURE — 2709999900 HC NON-CHARGEABLE SUPPLY: Performed by: SURGERY

## 2022-09-14 PROCEDURE — 3600000014 HC SURGERY LEVEL 4 ADDTL 15MIN: Performed by: SURGERY

## 2022-09-14 PROCEDURE — 84703 CHORIONIC GONADOTROPIN ASSAY: CPT

## 2022-09-14 PROCEDURE — 3700000000 HC ANESTHESIA ATTENDED CARE: Performed by: SURGERY

## 2022-09-14 PROCEDURE — 2500000003 HC RX 250 WO HCPCS: Performed by: NURSE ANESTHETIST, CERTIFIED REGISTERED

## 2022-09-14 PROCEDURE — 6360000002 HC RX W HCPCS: Performed by: ANESTHESIOLOGY

## 2022-09-14 PROCEDURE — 3600000004 HC SURGERY LEVEL 4 BASE: Performed by: SURGERY

## 2022-09-14 PROCEDURE — 2500000003 HC RX 250 WO HCPCS

## 2022-09-14 PROCEDURE — 2580000003 HC RX 258: Performed by: SURGERY

## 2022-09-14 PROCEDURE — 7100000000 HC PACU RECOVERY - FIRST 15 MIN: Performed by: SURGERY

## 2022-09-14 PROCEDURE — 37780 REVISION OF LEG VEIN: CPT | Performed by: SURGERY

## 2022-09-14 PROCEDURE — 37765 STAB PHLEB VEINS XTR 10-20: CPT | Performed by: SURGERY

## 2022-09-14 PROCEDURE — 3700000001 HC ADD 15 MINUTES (ANESTHESIA): Performed by: SURGERY

## 2022-09-14 PROCEDURE — 7100000010 HC PHASE II RECOVERY - FIRST 15 MIN: Performed by: SURGERY

## 2022-09-14 PROCEDURE — 2500000003 HC RX 250 WO HCPCS: Performed by: SURGERY

## 2022-09-14 PROCEDURE — 7100000011 HC PHASE II RECOVERY - ADDTL 15 MIN: Performed by: SURGERY

## 2022-09-14 PROCEDURE — 2580000003 HC RX 258: Performed by: NURSE ANESTHETIST, CERTIFIED REGISTERED

## 2022-09-14 PROCEDURE — 7100000001 HC PACU RECOVERY - ADDTL 15 MIN: Performed by: SURGERY

## 2022-09-14 RX ORDER — OXYCODONE HYDROCHLORIDE AND ACETAMINOPHEN 5; 325 MG/1; MG/1
1 TABLET ORAL EVERY 6 HOURS PRN
Qty: 20 TABLET | Refills: 0 | Status: SHIPPED | OUTPATIENT
Start: 2022-09-14 | End: 2022-09-19

## 2022-09-14 RX ORDER — ONDANSETRON 2 MG/ML
4 INJECTION INTRAMUSCULAR; INTRAVENOUS
Status: DISCONTINUED | OUTPATIENT
Start: 2022-09-14 | End: 2022-09-14 | Stop reason: HOSPADM

## 2022-09-14 RX ORDER — MEPERIDINE HYDROCHLORIDE 25 MG/ML
12.5 INJECTION INTRAMUSCULAR; INTRAVENOUS; SUBCUTANEOUS
Status: DISCONTINUED | OUTPATIENT
Start: 2022-09-14 | End: 2022-09-14 | Stop reason: HOSPADM

## 2022-09-14 RX ORDER — SODIUM CHLORIDE 9 MG/ML
INJECTION, SOLUTION INTRAVENOUS PRN
Status: DISCONTINUED | OUTPATIENT
Start: 2022-09-14 | End: 2022-09-14 | Stop reason: HOSPADM

## 2022-09-14 RX ORDER — PROPOFOL 10 MG/ML
INJECTION, EMULSION INTRAVENOUS PRN
Status: DISCONTINUED | OUTPATIENT
Start: 2022-09-14 | End: 2022-09-14 | Stop reason: SDUPTHER

## 2022-09-14 RX ORDER — DEXAMETHASONE SODIUM PHOSPHATE 4 MG/ML
INJECTION, SOLUTION INTRA-ARTICULAR; INTRALESIONAL; INTRAMUSCULAR; INTRAVENOUS; SOFT TISSUE PRN
Status: DISCONTINUED | OUTPATIENT
Start: 2022-09-14 | End: 2022-09-14 | Stop reason: SDUPTHER

## 2022-09-14 RX ORDER — SODIUM CHLORIDE 0.9 % (FLUSH) 0.9 %
5-40 SYRINGE (ML) INJECTION EVERY 12 HOURS SCHEDULED
Status: DISCONTINUED | OUTPATIENT
Start: 2022-09-14 | End: 2022-09-14 | Stop reason: HOSPADM

## 2022-09-14 RX ORDER — ROCURONIUM BROMIDE 10 MG/ML
INJECTION, SOLUTION INTRAVENOUS PRN
Status: DISCONTINUED | OUTPATIENT
Start: 2022-09-14 | End: 2022-09-14 | Stop reason: SDUPTHER

## 2022-09-14 RX ORDER — LIDOCAINE HYDROCHLORIDE 20 MG/ML
INJECTION, SOLUTION EPIDURAL; INFILTRATION; INTRACAUDAL; PERINEURAL PRN
Status: DISCONTINUED | OUTPATIENT
Start: 2022-09-14 | End: 2022-09-14 | Stop reason: SDUPTHER

## 2022-09-14 RX ORDER — FENTANYL CITRATE 50 UG/ML
50 INJECTION, SOLUTION INTRAMUSCULAR; INTRAVENOUS EVERY 5 MIN PRN
Status: DISCONTINUED | OUTPATIENT
Start: 2022-09-14 | End: 2022-09-14 | Stop reason: HOSPADM

## 2022-09-14 RX ORDER — ONDANSETRON 2 MG/ML
INJECTION INTRAMUSCULAR; INTRAVENOUS PRN
Status: DISCONTINUED | OUTPATIENT
Start: 2022-09-14 | End: 2022-09-14 | Stop reason: SDUPTHER

## 2022-09-14 RX ORDER — MIDAZOLAM HYDROCHLORIDE 1 MG/ML
INJECTION INTRAMUSCULAR; INTRAVENOUS PRN
Status: DISCONTINUED | OUTPATIENT
Start: 2022-09-14 | End: 2022-09-14 | Stop reason: SDUPTHER

## 2022-09-14 RX ORDER — FENTANYL CITRATE 50 UG/ML
25 INJECTION, SOLUTION INTRAMUSCULAR; INTRAVENOUS EVERY 5 MIN PRN
Status: DISCONTINUED | OUTPATIENT
Start: 2022-09-14 | End: 2022-09-14 | Stop reason: HOSPADM

## 2022-09-14 RX ORDER — SODIUM CHLORIDE 0.9 % (FLUSH) 0.9 %
5-40 SYRINGE (ML) INJECTION PRN
Status: DISCONTINUED | OUTPATIENT
Start: 2022-09-14 | End: 2022-09-14 | Stop reason: HOSPADM

## 2022-09-14 RX ORDER — SODIUM CHLORIDE 9 MG/ML
INJECTION, SOLUTION INTRAVENOUS CONTINUOUS PRN
Status: DISCONTINUED | OUTPATIENT
Start: 2022-09-14 | End: 2022-09-14 | Stop reason: SDUPTHER

## 2022-09-14 RX ORDER — FENTANYL CITRATE 50 UG/ML
INJECTION, SOLUTION INTRAMUSCULAR; INTRAVENOUS PRN
Status: DISCONTINUED | OUTPATIENT
Start: 2022-09-14 | End: 2022-09-14 | Stop reason: SDUPTHER

## 2022-09-14 RX ADMIN — FENTANYL CITRATE 50 MCG: 50 INJECTION INTRAMUSCULAR; INTRAVENOUS at 11:24

## 2022-09-14 RX ADMIN — DEXAMETHASONE SODIUM PHOSPHATE 8 MG: 4 INJECTION, SOLUTION INTRAMUSCULAR; INTRAVENOUS at 11:22

## 2022-09-14 RX ADMIN — LIDOCAINE HYDROCHLORIDE 60 MG: 20 INJECTION, SOLUTION EPIDURAL; INFILTRATION; INTRACAUDAL; PERINEURAL at 11:06

## 2022-09-14 RX ADMIN — ROCURONIUM BROMIDE 40 MG: 10 INJECTION INTRAVENOUS at 11:06

## 2022-09-14 RX ADMIN — FENTANYL CITRATE 50 MCG: 50 INJECTION, SOLUTION INTRAMUSCULAR; INTRAVENOUS at 13:43

## 2022-09-14 RX ADMIN — ONDANSETRON 4 MG: 2 INJECTION INTRAMUSCULAR; INTRAVENOUS at 12:43

## 2022-09-14 RX ADMIN — PROPOFOL 120 MG: 10 INJECTION, EMULSION INTRAVENOUS at 11:06

## 2022-09-14 RX ADMIN — SODIUM CHLORIDE: 9 INJECTION, SOLUTION INTRAVENOUS at 10:45

## 2022-09-14 RX ADMIN — CEFAZOLIN 2000 MG: 2 INJECTION, POWDER, FOR SOLUTION INTRAMUSCULAR; INTRAVENOUS at 11:00

## 2022-09-14 RX ADMIN — MIDAZOLAM 2 MG: 1 INJECTION INTRAMUSCULAR; INTRAVENOUS at 11:00

## 2022-09-14 RX ADMIN — FENTANYL CITRATE 50 MCG: 50 INJECTION INTRAMUSCULAR; INTRAVENOUS at 11:04

## 2022-09-14 RX ADMIN — SUGAMMADEX 125 MG: 100 INJECTION, SOLUTION INTRAVENOUS at 12:49

## 2022-09-14 RX ADMIN — PROPOFOL 30 MG: 10 INJECTION, EMULSION INTRAVENOUS at 11:08

## 2022-09-14 ASSESSMENT — PAIN DESCRIPTION - DESCRIPTORS
DESCRIPTORS: ACHING

## 2022-09-14 ASSESSMENT — PAIN DESCRIPTION - FREQUENCY
FREQUENCY: INTERMITTENT

## 2022-09-14 ASSESSMENT — PAIN SCALES - GENERAL
PAINLEVEL_OUTOF10: 4
PAINLEVEL_OUTOF10: 7
PAINLEVEL_OUTOF10: 7
PAINLEVEL_OUTOF10: 0

## 2022-09-14 ASSESSMENT — PAIN DESCRIPTION - ONSET
ONSET: ON-GOING

## 2022-09-14 ASSESSMENT — PAIN DESCRIPTION - PAIN TYPE: TYPE: SURGICAL PAIN

## 2022-09-14 ASSESSMENT — PAIN DESCRIPTION - LOCATION
LOCATION: LEG

## 2022-09-14 ASSESSMENT — PAIN DESCRIPTION - ORIENTATION
ORIENTATION: LEFT;RIGHT
ORIENTATION: RIGHT;LEFT
ORIENTATION: RIGHT;LEFT

## 2022-09-14 ASSESSMENT — PAIN - FUNCTIONAL ASSESSMENT
PAIN_FUNCTIONAL_ASSESSMENT: PREVENTS OR INTERFERES SOME ACTIVE ACTIVITIES AND ADLS

## 2022-09-14 ASSESSMENT — PAIN SCALES - WONG BAKER: WONGBAKER_NUMERICALRESPONSE: 0

## 2022-09-14 ASSESSMENT — LIFESTYLE VARIABLES: SMOKING_STATUS: 1

## 2022-09-14 NOTE — DISCHARGE INSTRUCTIONS
Keep leg wrapped with gauze and ace wrap x24 hours. Then remove ace wrap and gauze, throw gauze away, and re-wrap with ace wrap. Leave steri-strips in place. Today keep leg elevated above heart as much as possible. May walk and go up steps. May walk to the bathroom and sit up for short periods to eat. Enrrique Maldonado will want to see you in about 2 weeks. The pain medicine prescription is to be taken as needed, but should be needed less with each passing day. Call office, [phone], for fever over 101.5, excessive bleeding, and pain that seems to be worsening instead of improving.

## 2022-09-14 NOTE — H&P
Agree with H&P from Outpt notes, no changes noted . I have presented reasonable alternatives to the patient's proposed care, treatment, and services. The discussion I have done encompassed risks, benefits, and side effects related to the alternatives and the risks related to not receiving the proposed care, treatment, and services. All questions answered. Patient wishes to proceed.   Both legs marked for LSV ligations and bilat stabs

## 2022-09-14 NOTE — PROGRESS NOTES
Patient admitted to PACU # 10 from OR at 1308 post [facility] - Bilateral, STAB PHLEBECTOMIES BILATERALLY per Dr. Wild Shields. Attached to PACU monitoring system and report received from anesthesia provider. Patient was reported to be hemodynamically stable during procedure. Patient drowsy on admission.        Electronically signed by Buster Larry RN on 9/14/2022 at 1:59 PM

## 2022-09-14 NOTE — ANESTHESIA PRE PROCEDURE
[facility] Department of Anesthesiology  Pre-Anesthesia Evaluation/Consultation       Name:  Betina Vinson  : 1985  Age:  44 y.o. MRN:  [id number]  Date: 2022           Surgeon: Surgeon(s):  Stacy Snyder MD    Procedure: Procedure(s):  OPEN REDUCTION INTERNAL FIXATION RIGHT ULNAR SHAFT FRACTURE WITH MINI C-ARM     No Known Allergies  Patient Active Problem List   Diagnosis    HGSIL on cytologic smear of cervix    Genital herpes simplex    Pain in both lower extremities    Lumbar facet arthropathy    SI (sacroiliac) joint dysfunction     Past Medical History:   Diagnosis Date    Alopecia     r/t eating disorder    Arthritis     SI joint    ASCUS with positive high risk HPV     Cervical high risk HPV (human papillomavirus) test positive     Closed fracture of shaft of right ulna 2019    Contact lens/glasses fitting     Contact lens/glasses fitting     Depressive disorder     history anorexia--goes to counseling/ post partum depression    Dysmenorrhea     Encounter for insertion of mirena IUD     History of basal cell carcinoma (BCC) 2021    Unequal leg length (acquired)      Past Surgical History:   Procedure Laterality Date    LEEP  2013,2012    hsil and hpv+    ORIF DISTAL RADIUS FRACTURE Right 2019    OPEN REDUCTION INTERNAL FIXATION RIGHT ULNAR SHAFT FRACTURE WITH MINI C-ARM performed by Dakota Ramachandran MD at [facility] History     Tobacco Use    Smoking status: Every Day     Packs/day: 0.25     Years: 22.00     Pack years: 5.50     Types: Cigarettes    Smokeless tobacco: Never    Tobacco comments:     encouraged to quit/13, cessation 1/15/14/cessasion 11/15   Vaping Use    Vaping Use: Every day    Substances: Nicotine (6 mg), Flavoring   Substance Use Topics    Alcohol use: Yes     Comment: 1-2 drinks/wk.     Drug use: Yes     Types: Marijuana Adelina Postin) Comment: rare     Medications  Current Facility-Administered Medications on File Prior to Visit   Medication Dose Route Frequency Provider Last Rate Last Admin    ceFAZolin (ANCEF) 2,000 mg in dextrose 5 % 50 mL IVPB (mini-bag)  2,000 mg IntraVENous Once Alan Ferrell MD        sodium chloride flush 0.9 % injection 5-40 mL  5-40 mL IntraVENous 2 times per day Skip Ugarte MD        sodium chloride flush 0.9 % injection 5-40 mL  5-40 mL IntraVENous PRN Skip Ugarte MD        0.9 % sodium chloride infusion   IntraVENous PRN Skip Ugarte MD         Current Outpatient Medications on File Prior to Visit   Medication Sig Dispense Refill    Multiple Vitamins-Minerals (THERAPEUTIC MULTIVITAMIN-MINERALS) tablet Take 1 tablet by mouth in the morning.  valACYclovir (VALTREX) 500 MG tablet Take 1 tablet by mouth daily 90 tablet 3     No current facility-administered medications for this visit. No current outpatient medications on file. Facility-Administered Medications Ordered in Other Visits   Medication Dose Route Frequency Provider Last Rate Last Admin    ceFAZolin (ANCEF) 2,000 mg in dextrose 5 % 50 mL IVPB (mini-bag)  2,000 mg IntraVENous Once Alan Ferrell MD        sodium chloride flush 0.9 % injection 5-40 mL  5-40 mL IntraVENous 2 times per day Skip Ugarte MD        sodium chloride flush 0.9 % injection 5-40 mL  5-40 mL IntraVENous PRN Skip Ugarte MD        0.9 % sodium chloride infusion   IntraVENous PRN Skip Ugarte MD         Vital Signs (Current)      Vital Signs Statistics (for past 48 hrs)     No data recorded  BP Readings from Last 3 Encounters:   09/02/22 100/70   08/04/22 98/60   05/12/22 118/76       BMI  There is no height or weight on file to calculate BMI. Estimated body mass index is 19.05 kg/m² as calculated from the following:    Height as of an earlier encounter on 9/14/22: 5' 6\" (1.676 m). Weight as of an earlier encounter on 9/14/22: 118 lb (53.5 kg).     CBC   Lab Results   Component Value Date/Time    WBC 9.1 09/17/2021 10:47 AM    RBC 4.25 09/17/2021 10:47 AM    HGB 13.8 09/17/2021 10:47 AM    HCT 40.0 09/17/2021 10:47 AM    MCV 94.0 09/17/2021 10:47 AM    RDW 12.7 09/17/2021 10:47 AM     09/17/2021 10:47 AM     CMP    Lab Results   Component Value Date/Time     09/17/2021 10:47 AM    K 4.8 09/17/2021 10:47 AM     09/17/2021 10:47 AM    CO2 24 09/17/2021 10:47 AM    BUN 10 09/17/2021 10:47 AM    CREATININE 0.6 09/17/2021 10:47 AM    GFRAA >60 09/17/2021 10:47 AM    GFRAA >60 05/13/2013 11:00 AM    AGRATIO 1.7 09/17/2021 10:47 AM    LABGLOM >60 09/17/2021 10:47 AM    GLUCOSE 94 09/17/2021 10:47 AM    PROT 7.3 09/17/2021 10:47 AM    CALCIUM 9.9 09/17/2021 10:47 AM    BILITOT 0.5 09/17/2021 10:47 AM    ALKPHOS 91 09/17/2021 10:47 AM    AST 16 09/17/2021 10:47 AM    ALT 12 09/17/2021 10:47 AM     BMP    Lab Results   Component Value Date/Time     09/17/2021 10:47 AM    K 4.8 09/17/2021 10:47 AM     09/17/2021 10:47 AM    CO2 24 09/17/2021 10:47 AM    BUN 10 09/17/2021 10:47 AM    CREATININE 0.6 09/17/2021 10:47 AM    CALCIUM 9.9 09/17/2021 10:47 AM    GFRAA >60 09/17/2021 10:47 AM    GFRAA >60 05/13/2013 11:00 AM    LABGLOM >60 09/17/2021 10:47 AM    GLUCOSE 94 09/17/2021 10:47 AM     POCGlucose  No results for input(s): GLUCOSE in the last 72 hours.    Coags  No results found for: PROTIME, INR, APTT  HCG (If Applicable)   Lab Results   Component Value Date    PREGTESTUR neg 05/12/2022      ABGs No results found for: PHART, PO2ART, ESQ9PRJ, HLO2UPO, BEART, X6OKAIBO   Type & Screen (If Applicable)  Lab Results   Component Value Date    LABABO O 05/13/2013    LABRH Positive 05/13/2013                            BMI: Wt Readings from Last 3 Encounters:       NPO Status:  >8h                          Anesthesia Evaluation  Patient summary reviewed no history of anesthetic complications:   Airway: Mallampati: II  TM distance: >3 FB     Mouth opening: > = 3 FB   Dental: normal exam         Pulmonary: breath sounds clear to auscultation  (+) current smoker    (-) COPD, asthma, recent URI and sleep apnea          Patient smoked on day of surgery. Cardiovascular:  Exercise tolerance: good (>4 METS),       (-) hypertension, CABG/stent and no hyperlipidemia      Rhythm: regular  Rate: normal                    Neuro/Psych:   (+) psychiatric history:   (-) seizures, TIA and CVA           GI/Hepatic/Renal:        (-) GERD, hepatitis, liver disease, no renal disease and no morbid obesity       Endo/Other:        (-) diabetes mellitus, hypothyroidism               Abdominal:             Vascular:     - DVT and PE. Other Findings:             Anesthesia Plan      general     ASA 2       Induction: intravenous. MIPS: Postoperative opioids intended and Prophylactic antiemetics administered. Anesthetic plan and risks discussed with patient. Plan discussed with CRNA. This pre-anesthesia assessment may be used as a history and physical.    DOS STAFF ADDENDUM:    Pt seen and examined, chart reviewed (including anesthesia, drug and allergy history). No interval changes to history and physical examination. Anesthetic plan, risks, benefits, alternatives, and personnel involved discussed with patient. Patient verbalized an understanding and agrees to proceed.       Dikr Couch MD  September 14, 2022  9:50 AM

## 2022-09-14 NOTE — ANESTHESIA POSTPROCEDURE EVALUATION
Department of Anesthesiology  Postprocedure Note    Patient: Jaswinder Noonan  MRN: [id number]  YOB: 1985  Date of evaluation: 9/14/2022      Procedure Summary     Date: 09/14/22 Room / Location: [address]    Anesthesia Start: 1102 Anesthesia Stop: 1311    Procedures:       [facility] (Bilateral: Leg Lower)      STAB PHLEBECTOMIES BILATERALLY, GREATER THAN 20 (Bilateral: Leg Lower) Diagnosis:       Varicose veins of both lower extremities with pain      (VARICOSE VEINS OF BOTH LOWER EXTREMITIES WITH PAIN)    Surgeons: Kevin Nelson MD Responsible Provider: Gabrielle Mahoney MD    Anesthesia Type: general ASA Status: 2          Anesthesia Type: No value filed.     Eli Phase I: Eli Score: 7    Eli Phase II: Eli Score: 10      Anesthesia Post Evaluation    Patient location during evaluation: PACU  Patient participation: complete - patient participated  Level of consciousness: awake and alert  Pain score: 2  Airway patency: patent  Nausea & Vomiting: no nausea and no vomiting  Complications: no  Cardiovascular status: blood pressure returned to baseline  Respiratory status: acceptable  Hydration status: euvolemic

## 2022-09-15 NOTE — OP NOTE
deep  going vein and ligated it. In addition, while we had the incision open,  we used this to remove multiple varicosities that had been marked  preoperatively and these incisions were left open while we worked to do  stab avulsions the rest of the marked veins. Again, between 10 and 20  on each leg, at each site, we would make an incision with an 18-gauge  needle, reach in with #2 Oesch hook, grabbed the vein, pulled up through  the skin, and then grasped it with a mosquito and teased it as far as we  could in each direction and then avulsed it. Did this again on the  posterior calf and thigh of the left leg and on the right leg, posterior  calf and thigh as well as upper medial thigh where more veins were  found. Once we were satisfied that we removed all of the abnormal  veins, we closed the incisions at the lesser saphenous incision spot  with 3-0 Vicryl, then 4-0 Vicryl subcuticular, closed this with Skin  Affix glue, Dermabond, and then used Benzoin and Steri-Strips for the 10  to 15 incisions on each leg. We then wrapped with fluff gauze over the  incisions followed by Kerlix x2 followed by double 6-inch Ace wrap on  each leg. The patient tolerated the procedure well and was sent to  recovery room in good condition. Tyron Kyle MD    D: 09/14/2022 13:32:23       T: 09/14/2022 23:37:47     [id number]  Job#: [id number]     Doc#: [id number]    CC:   CAREY Saunders MD

## 2022-09-30 ENCOUNTER — OFFICE VISIT (OUTPATIENT)
Dept: VASCULAR SURGERY | Age: 37
End: 2022-09-30

## 2022-09-30 VITALS — DIASTOLIC BLOOD PRESSURE: 80 MMHG | BODY MASS INDEX: 19.05 KG/M2 | WEIGHT: 118 LBS | SYSTOLIC BLOOD PRESSURE: 96 MMHG

## 2022-09-30 DIAGNOSIS — M79.604 PAIN IN BOTH LOWER EXTREMITIES: Primary | ICD-10-CM

## 2022-09-30 DIAGNOSIS — M79.605 PAIN IN BOTH LOWER EXTREMITIES: Primary | ICD-10-CM

## 2022-09-30 DIAGNOSIS — I83.893 VARICOSE VEINS OF BILATERAL LOWER EXTREMITIES WITH OTHER COMPLICATIONS: ICD-10-CM

## 2022-09-30 PROCEDURE — 99024 POSTOP FOLLOW-UP VISIT: CPT | Performed by: SURGERY

## 2022-09-30 ASSESSMENT — ENCOUNTER SYMPTOMS
GASTROINTESTINAL NEGATIVE: 1
RESPIRATORY NEGATIVE: 1
ALLERGIC/IMMUNOLOGIC NEGATIVE: 1
EYES NEGATIVE: 1

## 2022-09-30 NOTE — PROGRESS NOTES
Daily Progress Note   Mathew Tovar MD      9/30/2022    Chief Complaint   Patient presents with    Post-Op Check     S/p Lesser saphenous vein ligation, 9/14. Pt states she is healing well no issues at this time. HISTORY OF PRESENT ILLNESS:                The patient is a 44 y.o. female who presents with a post op for bilateral varicose vein surgery done 9/14/2022. Faheem Bell says she is doing well. She comes in today with no steri-strips, and wearing her compression stockings. She has minimal, if any bruising.     Past Medical History:   Diagnosis Date    Alopecia     r/t eating disorder    Arthritis     SI joint    ASCUS with positive high risk HPV     Cervical high risk HPV (human papillomavirus) test positive     Closed fracture of shaft of right ulna 06/03/2019    Contact lens/glasses fitting     Contact lens/glasses fitting     Depressive disorder     history anorexia--goes to counseling/ post partum depression    Dysmenorrhea     Encounter for insertion of mirena IUD     History of basal cell carcinoma (BCC) 06/16/2021    Unequal leg length (acquired)        Past Surgical History:   Procedure Laterality Date    LEEP  2/26/2013,8/2012    hsil and hpv+    LIGATION OF SAPHENOUS VEIN Bilateral 9/14/2022    LESSER SAPHENOUS VEIN LIGATION performed by Soren Rios MD at [address] Right 6/6/2019    OPEN REDUCTION INTERNAL FIXATION RIGHT ULNAR SHAFT FRACTURE WITH MINI C-ARM performed by Neeraj Finley MD at [address] Bilateral 9/14/2022    STAB PHLEBECTOMIES BILATERALLY, GREATER THAN 20 performed by Soren Rios MD at [address] History     Socioeconomic History    Marital status: Single     Spouse name: Not on file    Number of children: Not on file    Years of education: Not on file    Highest education level: Not on file   Occupational History    Not on file   Tobacco Use    Smoking status: Every Day Packs/day: 0.25     Years: 22.00     Pack years: 5.50     Types: Cigarettes    Smokeless tobacco: Never    Tobacco comments:     encouraged to quit/6/6/13, cessation 1/15/14/cessasion 11/15   Vaping Use    Vaping Use: Every day    Substances: Nicotine (6 mg), Flavoring   Substance and Sexual Activity    Alcohol use: Yes     Comment: 1-2 drinks/wk.     Drug use: Yes     Types: Marijuana Estil Catena)     Comment: rare    Sexual activity: Yes     Partners: Male   Other Topics Concern    Not on file   Social History Narrative    Not on file     Social Determinants of Health     Financial Resource Strain: Low Risk     Difficulty of Paying Living Expenses: Not very hard   Food Insecurity: No Food Insecurity    Worried About Running Out of Food in the Last Year: Never true    Ran Out of Food in the Last Year: Never true   Transportation Needs: Not on file   Physical Activity: Not on file   Stress: Not on file   Social Connections: Not on file   Intimate Partner Violence: Not on file   Housing Stability: Not on file       Family History   Problem Relation Age of Onset    No Known Problems Mother     Diabetes Father     Heart Disease Father     Cirrhosis Father     Cancer Maternal Grandmother         breast ca- dx in 62s    Heart Disease Maternal Grandmother     Heart Disease Maternal Grandfather     Parkinsonism Paternal Grandmother     Heart Disease Paternal Grandfather     Rheum Arthritis Neg Hx     Osteoarthritis Neg Hx     Asthma Neg Hx     Breast Cancer Neg Hx     Heart Failure Neg Hx     High Cholesterol Neg Hx     Hypertension Neg Hx     Migraines Neg Hx     Ovarian Cancer Neg Hx     Rashes/Skin Problems Neg Hx     Seizures Neg Hx     Stroke Neg Hx     Thyroid Disease Neg Hx          Current Outpatient Medications:     Multiple Vitamins-Minerals (THERAPEUTIC MULTIVITAMIN-MINERALS) tablet, Take 1 tablet by mouth in the morning., Disp: , Rfl:     valACYclovir (VALTREX) 500 MG tablet, Take 1 tablet by mouth daily, Disp: 90 tablet, Rfl: 3    Patient has no known allergies. Vitals:    09/30/22 1421   BP: 96/80   Weight: 118 lb (53.5 kg)       Admission on 09/14/2022, Discharged on 09/14/2022   Component Date Value Ref Range Status    Pregnancy, Urine 09/14/2022 Negative  Detects HCG level >20 MIU/mL Final    Comment: Note:  Always repeat results in question with a serum  quantitative pregnancy test. A serum hCG is positive  2-5 days before the urine hCG test.         Review of Systems   Constitutional: Negative. HENT: Negative. Eyes: Negative. Respiratory: Negative. Cardiovascular:  Positive for leg swelling. Gastrointestinal: Negative. Endocrine: Negative. Genitourinary: Negative. Musculoskeletal: Negative. Skin: Negative. Allergic/Immunologic: Negative. Neurological: Negative. Hematological: Negative. Psychiatric/Behavioral: Negative. All other systems reviewed and are negative. Physical Exam  Vitals and nursing note reviewed. Constitutional:       Appearance: Normal appearance. She is well-developed. HENT:      Mouth/Throat:      Pharynx: No oropharyngeal exudate. Eyes:      Conjunctiva/sclera: Conjunctivae normal.      Pupils: Pupils are equal, round, and reactive to light. Cardiovascular:      Rate and Rhythm: Normal rate and regular rhythm. Pulses:           Carotid pulses are 2+ on the right side and 2+ on the left side. Radial pulses are 2+ on the right side and 2+ on the left side. Femoral pulses are 2+ on the right side and 2+ on the left side. Dorsalis pedis pulses are 2+ on the right side and 2+ on the left side. Posterior tibial pulses are 2+ on the right side and 2+ on the left side. Heart sounds: Normal heart sounds.       Comments:   MEASUREMENTS 05/06/22:    RIGHT ANKLE: 19.1 cm   RIGHT CALF: 32.4 cm     LEFT ANKLE: 19.8 cm   LEFT CALF: 32.8 cm     MEASUREMENTS 8/24/2021:    RIGHT ANKLE: 29.0 cm   RIGHT CALF: 31.5 cm     LEFT ANKLE: 29.5 cm   LEFT CALF: 32.0 cm     Pulmonary:      Effort: Pulmonary effort is normal.      Breath sounds: Normal breath sounds. Chest:       Abdominal:      General: Bowel sounds are normal.       Musculoskeletal:         General: Normal range of motion. Arms:       Cervical back: Normal range of motion. Legs:    Neurological:      Mental Status: She is alert and oriented to person, place, and time. Deep Tendon Reflexes: Reflexes are normal and symmetric. Psychiatric:         Speech: Speech normal.         Behavior: Behavior normal.         Thought Content: Thought content normal.         Judgment: Judgment normal.         ASSESSMENT:    Problem List Items Addressed This Visit          Medium    Varicose veins of bilateral lower extremities with other complications       Unprioritized    Pain in both lower extremities - Primary       PLAN:    Return as needed. Mariely Jean-Baptiste MA am scribing for and in the presence of Dianah Alpers, MD on this date of 09/30/22    I Eran Kaiser MD personally performed the services described in this documentation as scribed by the Medical Assistant Ata Vicente in my presence and it is both accurate and complete.       Electronically signed by Dianah Alpers, MD on 9/30/2022 at 4:18 PM

## 2022-11-29 ENCOUNTER — TELEPHONE (OUTPATIENT)
Dept: SURGERY | Age: 37
End: 2022-11-29

## 2022-11-29 NOTE — TELEPHONE ENCOUNTER
Would like to set up Sclerotherapy for January. Is aware that she will receive a call tomorrow, 11/30.

## 2023-01-06 ENCOUNTER — HOSPITAL ENCOUNTER (OUTPATIENT)
Dept: PHYSICAL THERAPY | Age: 38
Setting detail: THERAPIES SERIES
Discharge: HOME | End: 2023-01-06
Payer: MEDICARE

## 2023-01-06 PROCEDURE — 97161 PT EVAL LOW COMPLEX 20 MIN: CPT

## 2023-01-06 PROCEDURE — 97140 MANUAL THERAPY 1/> REGIONS: CPT

## 2023-01-06 PROCEDURE — 97110 THERAPEUTIC EXERCISES: CPT

## 2023-01-06 NOTE — PLAN OF CARE
[facility] [address] [address]       Physical Therapy Certification    Dear Referring Provider (secondary): SAGE Jackson,    We had the pleasure of evaluating the following patient for physical therapy services at [address] [place] [address]. A summary of our findings can be found in the initial assessment below. This includes our plan of care. If you have any questions or concerns regarding these findings, please do not hesitate to contact me at the office phone number checked above. Thank you for the referral.       Physician Signature:_______________________________Date:__________________  By signing above (or electronic signature), therapists plan is approved by physician      Patient: Shaye Ulloa   : 1985   MRN: [id number]  Referring Physician: Referring Provider (secondary): SAGE Joshua      Evaluation Date: 2023      Medical Diagnosis Information:  Diagnosis: M54.2, G89.29 (ICD-10-CM) - Chronic neck pain   Treatment Diagnosis: M54.2 Cervical pain; M54.6 Thoracic Pain; Insurance information: PT Insurance Information: [employer]; 30 [employer];no auth; no copay    Precautions/ Contra-indications: None    C-SSRS Triggered by Intake questionnaire (Past 2 wk assessment):   [x] No, Questionnaire did not trigger screening.   [] Yes, Patient intake triggered further evaluation      [] C-SSRS Screening completed  [] PCP notified via Plan of Care  [] Emergency services notified     Latex Allergy:  [x]NO      []YES  Preferred Language for Healthcare:   [x]English       []other:    SUBJECTIVE: Patient stated complaint: 44year old female presents to PT with left sided neck and thoracic pain. Worsened over the past two years without MARCIA. Thinks partially due to breast feeding her two year old daughter and sleeping in same bed as her daughter putting her in awkward positions.  Has tried medical massage with no effect. Icy hot as well. Pain is present at all times. Pain left side of neck/upper trpa area and radiates up left side of neck. No distal radiating. No numbness/tingling. Of note she also gets a pulling on her left lower thoracic / side. She is right hand dominant and mostly holds daughter on right side. [] bilateral radicular symptoms  [] bowel and/or bladder changes  [] Saddle Anesthesia  [] night pain  [] rapid changes in weight of >10 lbs  [x] Hx of cancer - skin cancer  [] Recent trauma  [] Hx of corticosteroid use  [] Headaches  [x] Dysphagia - on-going for years and a family history of esophogeal stretching procedures   [] Dysarthria  [] Dizziness  [] Diplopia  [] Drop Attacks  [] Ataxia  [] Nausea  []Numbness  [] Nystagmus        Imaging: None  Current Medications:  nothing prescribed; Bill HENRIQUEZ          Relevant Medical History: Anxiety; Functional Disability Index:    OUTCOME MEASURE DATE SCORE   FOTO Cervical 1/6/23 56            Pain Scale: 3/10 at rest; 5-6/10 at worst  Easing factors: avoiding below positions. Provocative factors: Flexing neck down; left side bend; turning right;      Type: [x]Constant   []Intermittent  [x]Radiating []Localized []other:     Numbness/Tingling: None    Occupation/School: [employer]     Living Status/Prior Level of Function: Independent with ADLs and IADLs;has 3year old daughter; enjoys working out but avoiding overhead movements.     OBJECTIVE:     1/6/23  ROM AROM Comments   Flexion 48/ 65 after manip Pain left side of neck   Extension 65 / 80 after manip Pain left side of neck   Side bend R 50 Tightness on left side of neck   Side bend L 45 / 50 after manip Pain left side ofneck   Rotation R 55    Rotation L 50    Shoulder AROM clearing WNL no pain all planes           1/6/23  Strength L R Comments   Neck flexion (C1-2)      Neck side bend (C3)      Shoulder elevation (C4)      Shoulder abduction (C5) 5 5    Shoulder flexion 5 5 Shoulder IR 5 5    Shoulder ER 5 5    Elbow flexion and /or wrist extension  (C6) 5 5    Elbow extension and/or wrist flexion  (C7) 5 5    Thumb extension and/or ulnar deviation ((C8) 5 5    Abduction and /or adduction of hand intrinsics (T1) 5 5            1/6/23  Special Test Results/Comment   Spurling's Pain in left upper trap but no radicular pain   Cervical Distraction Neg   ULTT 1-Median    ULTT 2-Median    ULTT 3-Radial    ULTT 4-Ulnar    Flexion Rotation Test    Neck Flexor Muscle Endurance Test    [employer] C-Spine Rules Neg   Alar Ligament Testing    Maira Sheen Test    Adson's Test      1/6/23  Dermatomes Normal Abnormal Comments   Superior head (C1)  x     Occiput (C2) x     Lateral neck (C3) x     AC joint (C4) x     Lateral shoulder (C5) x     Distal thumb (C6) x     Middle finger (C7) x     Distal 5th digit (C8) x     Inner forearm (T1) x     Inner upper arm (T2) x       1/6/23  Reflexes Normal Abnormal Comments   S1-2 Seated achilles      S1-2 Prone knee bend      L3-4 Patellar tendon      C5-6 Biceps x     C6 Brachioradialis x     C7-8 Triceps x     Clonus      Babinski      Yang's x         Joint mobility:    [x]Normal: cervical side glides; Cervical PAs C2-C6 ; first ribs bilaterally; second rib on right   [x]Hypo: Cervical PA at C7; thoracic PAs T1-T3; second rib on left   []Hyper    Palpation: TTP left second rib    Functional Mobility/Transfers: ind    Posture: slouched, forward head    Bandages/Dressings/Incisions: n/a    Gait: (include devices/WB status): WNL     Orthopedic Special Tests: see above. [x] Patient history, allergies, meds reviewed. Medical chart reviewed. See intake form. Review Of Systems (ROS):  [x]Performed Review of systems (Integumentary, CardioPulmonary, Neurological) by intake and observation. Intake form has been scanned into medical record.  Patient has been instructed to contact their primary care physician regarding ROS issues if not already being addressed at this time. Co-morbidities/Complexities (which will affect course of rehabilitation):   [x]None           Arthritic conditions   []Rheumatoid arthritis (M05.9)  []Osteoarthritis (M19.91)   Cardiovascular conditions   []Hypertension (I10)  []Hyperlipidemia (E78.5)  []Angina pectoris (I20)  []Atherosclerosis (I70)   Musculoskeletal conditions   []Disc pathology   []Congenital spine pathologies   []Prior surgical intervention  []Osteoporosis (M81.8)  []Osteopenia (M85.8)   Endocrine conditions   []Hypothyroid (E03.9)  []Hyperthyroid Gastrointestinal conditions   []Constipation (A65.83)   Metabolic conditions   []Morbid obesity (E66.01)  []Diabetes type 1(E10.65) or 2 (E11.65)   []Neuropathy (G60.9)     Pulmonary conditions   []Asthma (J45)  []Coughing   []COPD (J44.9)   Psychological Disorders  []Anxiety (F41.9)  []Depression (F32.9)   []Other:   []Other:          Barriers to/and or personal factors that will affect rehab potential:              [x]Age  []Sex              [x]Motivation/Lack of Motivation                        []Co-Morbidities              []Cognitive Function, education/learning barriers              []Environmental, home barriers              []profession/work barriers  [x]past PT/medical experience  []other:  Justification: motivated to reduce pain; is young and in good health; has had PT before with success for low back/SIJ;    Falls Risk Assessment (30 days):   [x] Falls Risk assessed and no intervention required.   [] Falls Risk assessed and Patient requires intervention due to being higher risk   TUG score (>12s at risk):     [] Falls education provided, including       G-Codes:     OUTCOME MEASURE DATE SCORE   FOTO Cervical 1/6/23 56            ASSESSMENT:    Functional Impairments:     [x]Noted cervical/thoracic/GHJ joint hypomobility   []Noted cervical/thoracic/GHJ joint hypermobility   [x]Decreased cervical/UE functional ROM   []Noted Headache pain aggravated by neck movements with/without dizziness   []Abnormal reflexes/sensation/myotomal/dermatomal deficits   []Decreased DCF control or ability to hold head up   [x]Decreased RC/scapular/core strength and neuromuscular control    []Decreased UE functional strength   []other:      Functional Activity Limitations (from functional questionnaire and intake)   [x]Reduced ability to tolerate prolonged functional positions   [x]Reduced ability or difficulty with changes of positions or transfers between positions   [x]Reduced ability to maintain good posture and demonstrate good body mechanics with sitting, bending, and lifting   [] Reduced ability or tolerance with driving and/or computer work   [x]Reduced ability to perform lifting, reaching, carrying tasks   []Reduced ability to concentrate   [x]Reduced ability to sleep    []Reduced ability to tolerate any impact through UE or spine   [x]Reduced ability to ambulate prolonged functional periods/distances   []other:    Participation Restrictions   [x]Reduced participation in self care activities   [x]Reduced participation in home management activities   [x]Reduced participation in work activities   [x]Reduced participation in social activities. []Reduced participation in sport/recreational activities.     Classification/Subgrouping:   [x]signs/symptoms consistent with neck pain with mobility deficits     []signs/symptoms consistent with neck pain with movement coordinated impairments    []signs/symptoms consistent with neck pain with radiating pain    []signs/symptoms consistent with neck pain with headaches (cervicogenic)    []Signs/symptoms consistent with nerve root involvement including myotome & dermatome dysfunction   [x]sign/symptoms consistent with facet dysfunction of cervical and thoracic spine    []signs/symptoms consistent suggesting central cord compression/UMN syndromes   []signs/symptoms consistent with discogenic cervical pain   [x]signs/symptoms consistent with rib dysfunction   [x]signs/symptoms consistent with postural dysfunction   []signs/symptoms consistent with shoulder pathology    []signs/symptoms consistent with post-surgical status including decreased ROM, strength and function. []signs/symptoms consistent with pathology which may benefit from Dry Needling   []signs/symptoms which may limit the use of advanced manual therapy techniques: (Hypertension, recent trauma, intolerance to end range positions, prior TIA, visual issues, UE myotomes loss )     Prognosis/Rehab Potential:      []Excellent   [x]Good    []Fair   []Poor    Tolerance of evaluation/treatment:    []Excellent   [x]Good    []Fair   []Poor      Physical Therapy Evaluation Complexity Justification  [x] A history of present problem with:  [x] no personal factors and/or comorbidities that impact the plan of care;  []1-2 personal factors and/or comorbidities that impact the plan of care  []3 personal factors and/or comorbidities that impact the plan of care  [x] An examination of body systems using standardized tests and measures addressing any of the following: body structures and functions (impairments), activity limitations, and/or participation restrictions;:  [] a total of 1-2 or more elements   [] a total of 3 or more elements   [x] a total of 4 or more elements   [x] A clinical presentation with:  [x] stable and/or uncomplicated characteristics   [] evolving clinical presentation with changing characteristics  [] unstable and unpredictable characteristics;   [x] Clinical decision making of [x] low, [] moderate, [] high complexity using standardized patient assessment instrument and/or measurable assessment of functional outcome.     [x] EVAL (LOW) 86413 (typically 20 minutes face-to-face)  [] EVAL (MOD) 98853 (typically 30 minutes face-to-face)  [] EVAL (HIGH) 83693 (typically 45 minutes face-to-face)  [] RE-EVAL     PLAN:   Frequency/Duration:  1-2 days per week for 4 Weeks:  Interventions:  [x] Therapeutic exercise including: strength training, ROM, for cervical spine,scapula, core and Upper extremity, including postural re-education. [x]  NMR activation and proprioception for Deep cervical flexors, periscapular and RC muscles and Core, including postural re-education. [x]  Manual therapy as indicated for C/T spine, ribs, Soft tissue to include: Dry Needling/IASTM, STM, PROM, Gr I-IV mobilizations, manipulation. [x] Modalities as needed that may include: thermal agents, E-stim, Biofeedback, US, iontophoresis as indicated  [x] Patient education on joint protection, postural re-education, activity modification, progression of HEP. HEP instruction:   HEP:  Patient instructed on HEP on this date with handout provided and all questions answered. Discussions about how to progress sets/reps/resistance as necessary for fatigue and challenge. Patient was instructed to contact PT with any questions or concerns about HEP moving forward. Patient verbally stated she/he understood. Access Code: [email or web address]  URL: [email or web address]  Date: 01/06/2023  Prepared by: [employer]    Exercises  Seated Thoracic Self-Mobilization - 1 x daily - 7 x weekly - 1-3 sets - 10 reps  Thoracic Mobilization on Foam Roll - Hands Clasped - 1 x daily - 7 x weekly - 1-3 sets - 10 reps  Supine Thoracic Mobilization Towel Roll Vertical with Arm Stretch - 1 x daily - 7 x weekly - 3 sets - 30 hold  Quadruped Cat Cow - 1 x daily - 7 x weekly - 1-3 sets - 10 reps  Quadruped Full Range Thoracic Rotation with Reach - 1 x daily - 7 x weekly - 1-3 sets - 10 reps  First Rib Mobilization with Strap - 1 x daily - 7 x weekly - 1-3 sets - 10 reps  Supine Belly Breathing with Hands on Belly and Chest - 1 x daily - 7 x weekly - 1-2 minute      GOALS:   Patient stated goal: Eliminate pain in neck/shoulder    [] Progressing: [] Met: [] Not Met: [] Adjusted    Therapist goals for Patient:   Short Term Goals: To be achieved in: 2 weeks  1. Independent in HEP and progression per patient tolerance, in order to prevent re-injury. [] Progressing: [] Met: [] Not Met: [] Adjusted   2. Patient will have a decrease in pain to facilitate improvement in movement, function, and ADLs as indicated by Functional Deficits. [] Progressing: [] Met: [] Not Met: [] Adjusted    Long Term Goals: To be achieved in: 4 weeks  1. Disability index score of 64 or better on FOTO Neck  to assist with reaching prior level of function. [] Progressing: [] Met: [] Not Met: [] Adjusted  2. Patient will demonstrate increased AROM to cervical spine to WNL and without pain to allow for proper joint functioning as indicated by patients Functional Deficits. [] Progressing: [] Met: [] Not Met: [] Adjusted  Patient will demonstrate an increase in postural awareness and control and activation of  Deep cervical stabilizers to allow for proper functional mobility as indicated by patients Functional Deficits. [] Progressing: [] Met: [] Not Met: [] Adjusted  4. Patient will return to holding her daughter without increased symptoms or restriction. [] Progressing: [] Met: [] Not Met: [] Adjusted  5. Patient will work full day at job without pain in neck/shoulder at end of day. (patient specific functional goal)    [] Progressing: [] Met: [] Not Met: [] Adjusted      Electronically signed by:  Matthew Garza, PT, DPT, OCS    Note: If patient does not return for scheduled/ recommended follow up visits, this note will serve as a discharge from care along with most recent update on progress.

## 2023-01-06 NOTE — FLOWSHEET NOTE
[address] [place] [address] [facility], [place]      Physical Therapy Daily Treatment Note  Date:  2023    Patient Name:  Corby Alford    :  1985  MRN: [id number]  Restrictions/Precautions:    Medical/Treatment Diagnosis Information:  Diagnosis: M54.2, G89.29 (ICD-10-CM) - Chronic neck pain  Treatment Diagnosis: M54.2 Cervical pain; M54.6 Thoracic Pain; Insurance/Certification information:  PT Insurance Information: [place]; 27 VPCY;no auth; no copay  Physician Information:  Referring Provider (secondary):  Marthena Castleman, APRN-CNP  Has the plan of care been signed (Y/N):        []  Yes  [x]  No     Date of Patient follow up with Physician: Not scheduled      Is this a Progress Report:     []  Yes  [x]  No        If Yes:  Date Range for reporting period:  Beginnin23  Ending    Progress report will be due (10 Rx or 30 days whichever is less): 98       Recertification will be due (POC Duration  / 90 days whichever is less): 23      Visit # Insurance Allowable Auth Required   1 30 VPCY []  Yes [x]  No      OUTCOME MEASURE DATE SCORE   FOTO Cervical 23 56                Latex Allergy:  [x]NO      []YES  Preferred Language for Healthcare:   [x]English       []other:    Pain level:  3-6/10     SUBJECTIVE:  See eval    OBJECTIVE:   OBJECTIVE:      23  ROM AROM Comments   Flexion 48/ 65 after manip Pain left side of neck   Extension 65 / 80 after manip Pain left side of neck   Side bend R 50 Tightness on left side of neck   Side bend L 45 / 50 after manip Pain left side ofneck   Rotation R 55     Rotation L 50     Shoulder AROM clearing WNL no pain all planes                23  Strength L R Comments   Neck flexion (C1-2)         Neck side bend (C3)         Shoulder elevation (C4)         Shoulder abduction (C5) 5 5     Shoulder flexion 5 5     Shoulder IR 5 5     Shoulder ER 5 5     Elbow flexion and /or wrist extension  (C6) 5 5     Elbow extension and/or wrist flexion  (C7) 5 5     Thumb extension and/or ulnar deviation ((C8) 5 5     Abduction and /or adduction of hand intrinsics (T1) 5 5                  1/6/23  Special Test Results/Comment   Spurling's Pain in left upper trap but no radicular pain   Cervical Distraction Neg   ULTT 1-Median     ULTT 2-Median     ULTT 3-Radial     ULTT 4-Ulnar     Flexion Rotation Test     Neck Flexor Muscle Endurance Test     [employer] C-Spine Rules Neg   Alar Ligament Testing     Donna Valderrama Test     Adson's Test        1/6/23  Dermatomes Normal Abnormal Comments   Superior head (C1)  x       Occiput (C2) x       Lateral neck (C3) x       AC joint (C4) x       Lateral shoulder (C5) x       Distal thumb (C6) x       Middle finger (C7) x       Distal 5th digit (C8) x       Inner forearm (T1) x       Inner upper arm (T2) x          1/6/23  Reflexes Normal Abnormal Comments   S1-2 Seated achilles         S1-2 Prone knee bend         L3-4 Patellar tendon         C5-6 Biceps x       C6 Brachioradialis x       C7-8 Triceps x       Clonus         Babinski         Yang's x             Joint mobility:               [x]Normal: cervical side glides; Cervical PAs C2-C6 ; first ribs bilaterally; second rib on right              [x]Hypo: Cervical PA at C7; thoracic PAs T1-T3; second rib on left              []Hyper     Palpation: TTP left second rib     Functional Mobility/Transfers: ind     Posture: slouched, forward head     Bandages/Dressings/Incisions: n/a     Gait: (include devices/WB status): WNL      Orthopedic Special Tests: see above.       RESTRICTIONS/PRECAUTIONS: None  Exercises/Interventions:   Exercise/Equipment Resistance/Repetitions Other comments   Stretching/PROM     Thoracic self mob Demo for Chair with towel or supine with foam roll    Thoracic pec stretch Demo for HEP - supine on towel roll: T, Y, and hands behind head    Cat Camel X10 quadruped    Thoracic thread the needle X10 each way in quadruped    Rib Self Mob with Belt X10 L Only Cues for 1/2 inhale, slow exhale   Diaphragmatic breathing Demo for HEP: 1 to 2min per day    Chin Tuck NPV              Isometrics     Retraction NPV Prone   shrugs     Cervical Flexion      Cervical Extension     Cervical sidebending               PRE's     External Rotation     Internal Rotation     Serratus     Biceps     Triceps     Shrugs     Horizontal Abd with ER     Reverse Flys     EXT     Flexion     Abduction          Cable Column/Theraband     Scapular Retraction     External Rotation     Internal Rotation     Ext     TRIC     Lats     Shrugs     Flex     BIC     PNF                Manual Intervention      Cerv mobs/manip      Thoracic mobs/manip X1 Seated Thrust  Towel roll upper T-Spine   CT manip x1     Rib mobilizations 5'  Left second rib     Therapeutic Exercise and NMR EXR  [x] (06522) Provided verbal/tactile cueing for activities related to strengthening, flexibility, endurance, ROM  for improvements in cervical, postural, scapular, scapulothoracic and UE control with self care, reaching, carrying, lifting, house/yardwork, driving/computer work. [x] (15712) Provided verbal/tactile cueing for activities related to improving balance, coordination, kinesthetic sense, posture, motor skill, proprioception  to assist with cervical, scapular, scapulothoracic and UE control with self care, reaching, carrying, lifting, house/yardwork, driving/computer work. Therapeutic Activities:    [x] (00665 or 14298) Provided verbal/tactile cueing for activities related to improving balance, coordination, kinesthetic sense, posture, motor skill, proprioception and motor activation to allow for proper function of cervical, scapular, scapulothoracic and UE control with self care, carrying, lifting, driving/computer work.      Home Exercise Program:    [x] (98282) Reviewed/Progressed HEP activities related to strengthening, flexibility, endurance, ROM of cervical, scapular, scapulothoracic and UE control with self care, reaching, carrying, lifting, house/yardwork, driving/computer work  [] (28987) Reviewed/Progressed HEP activities related to improving balance, coordination, kinesthetic sense, posture, motor skill, proprioception of cervical, scapular, scapulothoracic and UE control with self care, reaching, carrying, lifting, house/yardwork, driving/computer work      Manual Treatments:  PROM / STM / Oscillations-Mobs:  G-I, II, III, IV (PA's, Inf., Post.)  [x] (84117) Provided manual therapy to mobilize soft tissue/joints of cervical/CT, scapular GHJ and UE for the purpose of decreasing headache, modulating pain, promoting relaxation,  increasing ROM, reducing/eliminating soft tissue swelling/inflammation/restriction, improving soft tissue extensibility and allowing for proper ROM for normal function with self care, reaching, carrying, lifting, house/yardwork, driving/computer work    Modalities:      Charges:  Timed Code Treatment Minutes: 25   Total Treatment Minutes: 50       [x] EVAL (LOW) 20824 (typically 20 minutes face-to-face)  [] EVAL (MOD) 32010 (typically 30 minutes face-to-face)  [] EVAL (HIGH) 37148 (typically 45 minutes face-to-face)  [] RE-EVAL     [x] ZG(69290) x    1 [] IONTO  [] NMR (86072) x     [] VASO  [x] Manual (31811) x  1    [] Other:  [] TA x      [] Mech Traction (25202)  [] ES(attended) (16829)      [] ES (un) (44350):     GOALS:   Patient stated goal: Eliminate pain in neck/shoulder    [] Progressing: [] Met: [] Not Met: [] Adjusted     Therapist goals for Patient:   Short Term Goals: To be achieved in: 2 weeks  1. Independent in HEP and progression per patient tolerance, in order to prevent re-injury. [] Progressing: [] Met: [] Not Met: [] Adjusted   2. Patient will have a decrease in pain to facilitate improvement in movement, function, and ADLs as indicated by Functional Deficits. [] Progressing: [] Met: [] Not Met: [] Adjusted     Long Term Goals:  To be achieved in: 4 weeks  1. Disability index score of 64 or better on FOTO Neck  to assist with reaching prior level of function. [] Progressing: [] Met: [] Not Met: [] Adjusted  2. Patient will demonstrate increased AROM to cervical spine to WNL and without pain to allow for proper joint functioning as indicated by patients Functional Deficits. [] Progressing: [] Met: [] Not Met: [] Adjusted  Patient will demonstrate an increase in postural awareness and control and activation of  Deep cervical stabilizers to allow for proper functional mobility as indicated by patients Functional Deficits. [] Progressing: [] Met: [] Not Met: [] Adjusted  4. Patient will return to holding her daughter without increased symptoms or restriction. [] Progressing: [] Met: [] Not Met: [] Adjusted  5. Patient will work full day at job without pain in neck/shoulder at end of day. (patient specific functional goal)    [] Progressing: [] Met: [] Not Met: [] Adjusted        Overall Progression Towards Functional goals/ Treatment Progress Update:  [] Patient is progressing as expected towards functional goals listed. [] Progression is slowed due to complexities/Impairments listed. [] Progression has been slowed due to co-morbidities. [x] Plan just implemented, too soon to assess goals progression <30days   [] Goals require adjustment due to lack of progress  [] Patient is not progressing as expected and requires additional follow up with physician  [] Other    Prognosis for POC: [x] Good [] Fair  [] Poor      Patient requires continued skilled intervention: [x] Yes  [] No    Treatment/Activity Tolerance:  [x] Patient able to complete treatment  [] Patient limited by fatigue  [] Patient limited by pain     [] Patient limited by other medical complications  [] Other: multiple cavitations with seated thoracic manip. One cavitation with CT thrust. Improved rib mobility after rib mobs.  ROM also improved after manual intervention although overall symptoms unchanged (see objective table above). PLAN: See eval  [] Continue per plan of care [] Alter current plan (see comments above)  [x] Plan of care initiated [] Hold pending MD visit [] Discharge      Electronically signed by:  Clarisa He, PT, DPT, OCS    Note: If patient does not return for scheduled/ recommended follow up visits, this note will serve as a discharge from care along with most recent update on progress.

## 2023-01-12 ENCOUNTER — HOSPITAL ENCOUNTER (OUTPATIENT)
Dept: PHYSICAL THERAPY | Age: 38
Setting detail: THERAPIES SERIES
Discharge: HOME | End: 2023-01-12
Payer: MEDICARE

## 2023-01-12 PROCEDURE — 97112 NEUROMUSCULAR REEDUCATION: CPT

## 2023-01-12 PROCEDURE — 97140 MANUAL THERAPY 1/> REGIONS: CPT

## 2023-01-12 PROCEDURE — 97110 THERAPEUTIC EXERCISES: CPT

## 2023-01-12 NOTE — FLOWSHEET NOTE
[address] [place] [address] [facility], [place]      Physical Therapy Daily Treatment Note  Date:  2023    Patient Name:  Janett Arevalo    :  1985  MRN: [id number]  Restrictions/Precautions:    Medical/Treatment Diagnosis Information:  Diagnosis: M54.2, G89.29 (ICD-10-CM) - Chronic neck pain  Treatment Diagnosis: M54.2 Cervical pain; M54.6 Thoracic Pain; Insurance/Certification information:  PT Insurance Information: [place]; 27 VPCY;no auth; no copay  Physician Information:  Referring Provider (secondary): SAGE Lafleur  Has the plan of care been signed (Y/N):        [x]  Yes  []  No     Date of Patient follow up with Physician: Not scheduled      Is this a Progress Report:     []  Yes  [x]  No        If Yes:  Date Range for reporting period:  Beginnin23  Ending    Progress report will be due (10 Rx or 30 days whichever is less): 3/0/60       Recertification will be due (POC Duration  / 90 days whichever is less): 23      Visit # Insurance Allowable Auth Required   2 30 VPCY []  Yes [x]  No      OUTCOME MEASURE DATE SCORE   FOTO Cervical 23 56                Latex Allergy:  [x]NO      []YES  Preferred Language for Healthcare:   [x]English       []other:    Pain level:  4/10     SUBJECTIVE: Does feel a bit better since first visit with less pain. Did HEP a few times in past week without issues.      OBJECTIVE:      23  ROM AROM Comments   Flexion 60/ 70 after manip Pain left side of neck   Extension 70 / 80 after manip Pain left side of neck   Side bend R 40/50 after manual Tightness on left side of neck   Side bend L 40 / 50 after manual Pain left side ofneck   Rotation R 55     Rotation L 50     Shoulder AROM clearing WNL no pain all planes                23  Strength L R Comments   Neck flexion (C1-2)         Neck side bend (C3)         Shoulder elevation (C4)         Shoulder abduction (C5) 5 5     Shoulder flexion 5 5     Shoulder IR 5 5     Shoulder ER 5 5     Elbow flexion and /or wrist extension  (C6) 5 5     Elbow extension and/or wrist flexion  (C7) 5 5     Thumb extension and/or ulnar deviation ((C8) 5 5     Abduction and /or adduction of hand intrinsics (T1) 5 5                  1/6/23  Special Test Results/Comment   Spurling's Pain in left upper trap but no radicular pain   Cervical Distraction Neg   ULTT 1-Median     ULTT 2-Median     ULTT 3-Radial     ULTT 4-Ulnar     Flexion Rotation Test     Neck Flexor Muscle Endurance Test     [employer] C-Spine Rules Neg   Alar Ligament Testing     Mariano Garnica Test     Adson's Test        1/6/23  Dermatomes Normal Abnormal Comments   Superior head (C1)  x       Occiput (C2) x       Lateral neck (C3) x       AC joint (C4) x       Lateral shoulder (C5) x       Distal thumb (C6) x       Middle finger (C7) x       Distal 5th digit (C8) x       Inner forearm (T1) x       Inner upper arm (T2) x          1/6/23  Reflexes Normal Abnormal Comments   S1-2 Seated achilles         S1-2 Prone knee bend         L3-4 Patellar tendon         C5-6 Biceps x       C6 Brachioradialis x       C7-8 Triceps x       Clonus         Babinski         Yang's x             Joint mobility:               [x]Normal: cervical side glides; Cervical PAs C2-C6 ; first ribs bilaterally; second rib on right              [x]Hypo: Cervical PA at C7; thoracic PAs T1-T3; second rib on left              []Hyper     Palpation: TTP left second rib     Functional Mobility/Transfers: ind     Posture: slouched, forward head     Bandages/Dressings/Incisions: n/a     Gait: (include devices/WB status): WNL      Orthopedic Special Tests: see above.       RESTRICTIONS/PRECAUTIONS: None  Exercises/Interventions:   Exercise/Equipment Resistance/Repetitions Other comments   Stretching/PROM           Cat Camel X10 quadruped    Thoracic thread the needle X10 each way in quadruped       Chin Tuck 10x10\"              Isometrics     Retraction     shrugs Cervical Flexion      Cervical Extension     Cervical sidebending               PRE's     External Rotation     Internal Rotation     Serratus     Biceps     Triceps     Shrugs     Horizontal Abd with ER     Reverse Flys     Prone Extension 3x10    Prone T 2x10    Prone Chicken Wing 3x5    Flexion     Abduction          Cable Column/Theraband     Scapular Retraction     External Rotation     Internal Rotation     Ext     TRIC     Lats     Shrugs     Flex     BIC     No Money 2x10 Red Cues to coordinate with breathing              Manual Intervention      IASTM 3'  Left UT and LS; medial scapular border   Cerv mobs/manip 3'  and UPAs C4-T4    Thoracic mobs/manip X1 Seated Thrust  Towel roll upper T-Spine       Rib mobilizations 5'  Left second rib     Therapeutic Exercise and NMR EXR  [x] (78287) Provided verbal/tactile cueing for activities related to strengthening, flexibility, endurance, ROM  for improvements in cervical, postural, scapular, scapulothoracic and UE control with self care, reaching, carrying, lifting, house/yardwork, driving/computer work. [x] (28105) Provided verbal/tactile cueing for activities related to improving balance, coordination, kinesthetic sense, posture, motor skill, proprioception  to assist with cervical, scapular, scapulothoracic and UE control with self care, reaching, carrying, lifting, house/yardwork, driving/computer work. Therapeutic Activities:    [x] (53898 or 43651) Provided verbal/tactile cueing for activities related to improving balance, coordination, kinesthetic sense, posture, motor skill, proprioception and motor activation to allow for proper function of cervical, scapular, scapulothoracic and UE control with self care, carrying, lifting, driving/computer work.      Home Exercise Program:    [x] (77449) Reviewed/Progressed HEP activities related to strengthening, flexibility, endurance, ROM of cervical, scapular, scapulothoracic and UE control with self care, reaching, carrying, lifting, house/yardwork, driving/computer work  [] (08274) Reviewed/Progressed HEP activities related to improving balance, coordination, kinesthetic sense, posture, motor skill, proprioception of cervical, scapular, scapulothoracic and UE control with self care, reaching, carrying, lifting, house/yardwork, driving/computer work    HEP:  Patient instructed on HEP on this date with handout provided and all questions answered. Discussions about how to progress sets/reps/resistance as necessary for fatigue and challenge. Patient was instructed to contact PT with any questions or concerns about HEP moving forward. Patient verbally stated she/he understood. Access Code: [email or web address]  URL: [facility]/  Date: 01/12/2023  Prepared by:  [employer]    Exercises  Seated Thoracic Self-Mobilization - 1 x daily - 7 x weekly - 1-3 sets - 10 reps  Thoracic Mobilization on Foam Roll - Hands Clasped - 1 x daily - 7 x weekly - 1-3 sets - 10 reps  Supine Thoracic Mobilization Towel Roll Vertical with Arm Stretch - 1 x daily - 7 x weekly - 3 sets - 30 hold  Quadruped Cat Cow - 1 x daily - 7 x weekly - 1-3 sets - 10 reps  Quadruped Full Range Thoracic Rotation with Reach - 1 x daily - 7 x weekly - 1-3 sets - 10 reps  First Rib Mobilization with Strap - 1 x daily - 7 x weekly - 1-3 sets - 10 reps  Supine Belly Breathing with Hands on Belly and Chest - 1 x daily - 7 x weekly - 1-2 minute  Gentle Levator Scapulae Stretch - 1 x daily - 7 x weekly - 3 sets - 30 hold  Seated Gentle Upper Trapezius Stretch - 1 x daily - 7 x weekly - 3 sets - 30 hold  Prone Shoulder Extension - 1 x daily - 3 x weekly - 3 sets - 10 reps  Prone Scapular Retraction Arms at Side - 1 x daily - 3 x weekly - 2-3 sets - 10 reps  Prone Scapular Retraction with Hand Behind Head - 1 x daily - 3 x weekly - 3 sets - 5-10 reps  Supine Scapular Protraction in Flexion with Dumbbells - 1 x daily - 3 x weekly - 3 sets - 10 reps  Quadruped Scapular Protraction and Retraction - 1 x daily - 3 x weekly - 3 sets - 10 reps  Shoulder External Rotation and Scapular Retraction with Resistance - 1 x daily - 3 x weekly - 3 sets - 10 reps      Manual Treatments:  PROM / STM / Oscillations-Mobs:  G-I, II, III, IV (PA's, Inf., Post.)  [x] (12405) Provided manual therapy to mobilize soft tissue/joints of cervical/CT, scapular GHJ and UE for the purpose of decreasing headache, modulating pain, promoting relaxation,  increasing ROM, reducing/eliminating soft tissue swelling/inflammation/restriction, improving soft tissue extensibility and allowing for proper ROM for normal function with self care, reaching, carrying, lifting, house/yardwork, driving/computer work    Modalities:      Charges:  Timed Code Treatment Minutes: 45   Total Treatment Minutes: 45       [] EVAL (LOW) 13154 (typically 20 minutes face-to-face)  [] EVAL (MOD) 68562 (typically 30 minutes face-to-face)  [] EVAL (HIGH) 77388 (typically 45 minutes face-to-face)  [] RE-EVAL     [x] KI(07555) x    1 [] IONTO  [x] NMR (35917) x    1 [] VASO  [x] Manual (38585) x  1    [] Other:  [] TA x      [] Mech Traction (18590)  [] ES(attended) (57722)      [] ES (un) (86136):     GOALS:   Patient stated goal: Eliminate pain in neck/shoulder    [] Progressing: [] Met: [] Not Met: [] Adjusted     Therapist goals for Patient:   Short Term Goals: To be achieved in: 2 weeks  1. Independent in HEP and progression per patient tolerance, in order to prevent re-injury. [] Progressing: [] Met: [] Not Met: [] Adjusted   2. Patient will have a decrease in pain to facilitate improvement in movement, function, and ADLs as indicated by Functional Deficits. [] Progressing: [] Met: [] Not Met: [] Adjusted     Long Term Goals: To be achieved in: 4 weeks  1. Disability index score of 64 or better on FOTO Neck  to assist with reaching prior level of function. [] Progressing: [] Met: [] Not Met: [] Adjusted  2.  Patient will demonstrate increased AROM to cervical spine to WNL and without pain to allow for proper joint functioning as indicated by patients Functional Deficits. [] Progressing: [] Met: [] Not Met: [] Adjusted  Patient will demonstrate an increase in postural awareness and control and activation of  Deep cervical stabilizers to allow for proper functional mobility as indicated by patients Functional Deficits. [] Progressing: [] Met: [] Not Met: [] Adjusted  4. Patient will return to holding her daughter without increased symptoms or restriction. [] Progressing: [] Met: [] Not Met: [] Adjusted  5. Patient will work full day at job without pain in neck/shoulder at end of day. (patient specific functional goal)    [] Progressing: [] Met: [] Not Met: [] Adjusted        Overall Progression Towards Functional goals/ Treatment Progress Update:  [] Patient is progressing as expected towards functional goals listed. [] Progression is slowed due to complexities/Impairments listed. [] Progression has been slowed due to co-morbidities. [x] Plan just implemented, too soon to assess goals progression <30days   [] Goals require adjustment due to lack of progress  [] Patient is not progressing as expected and requires additional follow up with physician  [] Other    Prognosis for POC: [x] Good [] Fair  [] Poor      Patient requires continued skilled intervention: [x] Yes  [] No    Treatment/Activity Tolerance:  [x] Patient able to complete treatment  [] Patient limited by fatigue  [] Patient limited by pain     [] Patient limited by other medical complications  [] Other: multiple cavitations with seated thoracic manip again today. Also immediate redness with IASTM to LS, UT, and medial scapular border on left side. ROM flexion, extension R and L SBing all improved after manual treatment. Added more scapular strengthening today with noticeable winging on left scapula as fatigue increased most notably inferior medial angle. Fatigued by end of visit. HEP updated and emphasized strengthening every other day. Encouraged heat and massage to upper trap and levator scap. PLAN: See eval  [] Continue per plan of care [] Alter current plan (see comments above)  [x] Plan of care initiated [] Hold pending MD visit [] Discharge      Electronically signed by:  Ladonna Singh, PT, DPT, OCS    Note: If patient does not return for scheduled/ recommended follow up visits, this note will serve as a discharge from care along with most recent update on progress.

## 2023-01-20 ENCOUNTER — PROCEDURE VISIT (OUTPATIENT)
Dept: VASCULAR SURGERY | Age: 38
End: 2023-01-20

## 2023-01-20 ENCOUNTER — HOSPITAL ENCOUNTER (OUTPATIENT)
Dept: PHYSICAL THERAPY | Age: 38
Setting detail: THERAPIES SERIES
Discharge: HOME | End: 2023-01-20
Payer: MEDICARE

## 2023-01-20 VITALS — BODY MASS INDEX: 19.05 KG/M2 | WEIGHT: 118 LBS

## 2023-01-20 DIAGNOSIS — I86.8 SPIDER VARICOSE VEINS: ICD-10-CM

## 2023-01-20 DIAGNOSIS — I83.893 VARICOSE VEINS OF BILATERAL LOWER EXTREMITIES WITH OTHER COMPLICATIONS: Primary | ICD-10-CM

## 2023-01-20 PROCEDURE — 97110 THERAPEUTIC EXERCISES: CPT

## 2023-01-20 PROCEDURE — 97112 NEUROMUSCULAR REEDUCATION: CPT

## 2023-01-20 PROCEDURE — 99024 POSTOP FOLLOW-UP VISIT: CPT | Performed by: SURGERY

## 2023-01-20 NOTE — FLOWSHEET NOTE
[address] [place] [address] [facility], [place]      Physical Therapy Daily Treatment Note  Date:  2023    Patient Name:  Angel Adams    :  1985  MRN: [id number]  Restrictions/Precautions:    Medical/Treatment Diagnosis Information:  Diagnosis: M54.2, G89.29 (ICD-10-CM) - Chronic neck pain  Treatment Diagnosis: M54.2 Cervical pain; M54.6 Thoracic Pain; Insurance/Certification information:  PT Insurance Information: [place]; 27 VPCY;no auth; no copay  Physician Information:  Referring Provider (secondary): SAGE Berman  Has the plan of care been signed (Y/N):        [x]  Yes  []  No     Date of Patient follow up with Physician: Not scheduled      Is this a Progress Report:     []  Yes  [x]  No        If Yes:  Date Range for reporting period:  Beginnin23  Ending    Progress report will be due (10 Rx or 30 days whichever is less): 13       Recertification will be due (POC Duration  / 90 days whichever is less): 23      Visit # Insurance Allowable Auth Required   3 30 VPCY []  Yes [x]  No      OUTCOME MEASURE DATE SCORE   FOTO Cervical 23 56                Latex Allergy:  [x]NO      []YES  Preferred Language for Healthcare:   [x]English       []other:    Pain level:  2-3/10     SUBJECTIVE: Continues to do well. Pain improving each week. New exercises have gone well without pain.       OBJECTIVE:      23  ROM AROM Comments   Flexion 65/ Pain left side of neck   Extension 78 / Pain left side of neck   Side bend R 40/Tightness on left side of neck   Side bend L 40 / Pain left side ofneck   Rotation R 55     Rotation L 50     Shoulder AROM clearing WNL no pain all planes                23  Strength L R Comments   Neck flexion (C1-2)         Neck side bend (C3)         Shoulder elevation (C4)         Shoulder abduction (C5) 5 5     Shoulder flexion 5 5     Shoulder IR 5 5     Shoulder ER 5 5     Elbow flexion and /or wrist extension  (C6) 5 5     Elbow extension and/or wrist flexion  (C7) 5 5     Thumb extension and/or ulnar deviation ((C8) 5 5     Abduction and /or adduction of hand intrinsics (T1) 5 5                  1/6/23  Special Test Results/Comment   Spurling's Pain in left upper trap but no radicular pain   Cervical Distraction Neg   ULTT 1-Median     ULTT 2-Median     ULTT 3-Radial     ULTT 4-Ulnar     Flexion Rotation Test     Neck Flexor Muscle Endurance Test     [facility] C-Spine Rules Neg   Alar Ligament Testing     Pinky Irasema Test     Adson's Test        1/6/23  Dermatomes Normal Abnormal Comments   Superior head (C1)  x       Occiput (C2) x       Lateral neck (C3) x       AC joint (C4) x       Lateral shoulder (C5) x       Distal thumb (C6) x       Middle finger (C7) x       Distal 5th digit (C8) x       Inner forearm (T1) x       Inner upper arm (T2) x          1/6/23  Reflexes Normal Abnormal Comments   S1-2 Seated achilles         S1-2 Prone knee bend         L3-4 Patellar tendon         C5-6 Biceps x       C6 Brachioradialis x       C7-8 Triceps x       Clonus         Babinski         Yang's x             Joint mobility:               [x]Normal: cervical side glides; Cervical PAs C2-C6 ; first ribs bilaterally; second rib on right              [x]Hypo: Cervical PA at C7; thoracic PAs T1-T3; second rib on left              []Hyper     Palpation: TTP left second rib     Functional Mobility/Transfers: ind     Posture: slouched, forward head     Bandages/Dressings/Incisions: n/a     Gait: (include devices/WB status): WNL      Orthopedic Special Tests: see above.       RESTRICTIONS/PRECAUTIONS: None  Exercises/Interventions:   Exercise/Equipment Resistance/Repetitions Other comments   Stretching/PROM           Cat Camel X10 quadruped    Thoracic thread the needle X10 each way in quadruped       Chin Tuck 10x10\"    Chin Tuck + Lift 2x10         Upper Trap 3x30\"    Levator Scap 3x30\"              Isometrics     Retraction     shrugs Cervical Flexion      Cervical Extension     Cervical sidebending               PRE's     External Rotation     Internal Rotation     Serratus Punch 3x10; 8# DBs    Quadruped serratus press 3x10    Push up + 1x10    Biceps     Triceps     Shrugs     Horizontal Abd with ER     Reverse Flys     Prone Extension 3x10; 1#    Prone T 3x10    Prone Chicken Wing 3x5    Flexion     Abduction     Wall Nikko 3x10                   Cable Column/Theraband     Scapular Retraction     External Rotation     Internal Rotation     Ext     TRIC     Lats     Shrugs     Flex     BIC     No Money 3x10 Red Cues to coordinate with breathing              Manual Intervention        Therapeutic Exercise and NMR EXR  [x] (71499) Provided verbal/tactile cueing for activities related to strengthening, flexibility, endurance, ROM  for improvements in cervical, postural, scapular, scapulothoracic and UE control with self care, reaching, carrying, lifting, house/yardwork, driving/computer work. [x] (59732) Provided verbal/tactile cueing for activities related to improving balance, coordination, kinesthetic sense, posture, motor skill, proprioception  to assist with cervical, scapular, scapulothoracic and UE control with self care, reaching, carrying, lifting, house/yardwork, driving/computer work. Therapeutic Activities:    [x] (68818 or 38639) Provided verbal/tactile cueing for activities related to improving balance, coordination, kinesthetic sense, posture, motor skill, proprioception and motor activation to allow for proper function of cervical, scapular, scapulothoracic and UE control with self care, carrying, lifting, driving/computer work.      Home Exercise Program:    [x] (59471) Reviewed/Progressed HEP activities related to strengthening, flexibility, endurance, ROM of cervical, scapular, scapulothoracic and UE control with self care, reaching, carrying, lifting, house/yardwork, driving/computer work  [] (91963) Reviewed/Progressed HEP activities related to improving balance, coordination, kinesthetic sense, posture, motor skill, proprioception of cervical, scapular, scapulothoracic and UE control with self care, reaching, carrying, lifting, house/yardwork, driving/computer work    HEP:  Patient instructed on HEP on this date with handout provided and all questions answered. Discussions about how to progress sets/reps/resistance as necessary for fatigue and challenge. Patient was instructed to contact PT with any questions or concerns about HEP moving forward. Patient verbally stated she/he understood. Access Code: [email or web address]  URL: [email or web address]  Date: 01/20/2023  Prepared by:  [employer]    Exercises  Seated Thoracic Self-Mobilization - 1 x daily - 7 x weekly - 1-3 sets - 10 reps  Thoracic Mobilization on Foam Roll - Hands Clasped - 1 x daily - 7 x weekly - 1-3 sets - 10 reps  Supine Thoracic Mobilization Towel Roll Vertical with Arm Stretch - 1 x daily - 7 x weekly - 3 sets - 30 hold  Quadruped Cat Cow - 1 x daily - 7 x weekly - 1-3 sets - 10 reps  Quadruped Full Range Thoracic Rotation with Reach - 1 x daily - 7 x weekly - 1-3 sets - 10 reps  First Rib Mobilization with Strap - 1 x daily - 7 x weekly - 1-3 sets - 10 reps  Supine Belly Breathing with Hands on Belly and Chest - 1 x daily - 7 x weekly - 1-2 minute  Gentle Levator Scapulae Stretch - 1 x daily - 7 x weekly - 3 sets - 30 hold  Seated Gentle Upper Trapezius Stretch - 1 x daily - 7 x weekly - 3 sets - 30 hold  Supine Chin Tuck - 1 x daily - 3 x weekly - 2 sets - 10 reps  Supine Deep Neck Flexor Training - Repetitions - 1 x daily - 3 x weekly - 2-3 sets - 10 reps  Prone Shoulder Extension - 1 x daily - 3 x weekly - 3 sets - 10 reps  Prone Scapular Retraction Arms at Side - 1 x daily - 3 x weekly - 3 sets - 10 reps  Prone Scapular Retraction with Hand Behind Head - 1 x daily - 3 x weekly - 3 sets - 5-10 reps  Supine Scapular Protraction in Flexion with Dumbbells - 1 x daily - 3 x weekly - 3 sets - 10 reps  Quadruped Scapular Protraction and Retraction - 1 x daily - 3 x weekly - 3 sets - 10 reps  Wall Push Up with Plus - 1 x daily - 3 x weekly - 3 sets - 10 reps  Shoulder External Rotation and Scapular Retraction with Resistance - 1 x daily - 3 x weekly - 3 sets - 10 reps  Wall [place] - 1 x daily - 3 x weekly - 3 sets - 10 reps        Manual Treatments:  PROM / STM / Oscillations-Mobs:  G-I, II, III, IV (PA's, Inf., Post.)  [x] (67068) Provided manual therapy to mobilize soft tissue/joints of cervical/CT, scapular GHJ and UE for the purpose of decreasing headache, modulating pain, promoting relaxation,  increasing ROM, reducing/eliminating soft tissue swelling/inflammation/restriction, improving soft tissue extensibility and allowing for proper ROM for normal function with self care, reaching, carrying, lifting, house/yardwork, driving/computer work    Modalities:      Charges:  Timed Code Treatment Minutes: 45   Total Treatment Minutes: 45       [] EVAL (LOW) 25733 (typically 20 minutes face-to-face)  [] EVAL (MOD) 18923 (typically 30 minutes face-to-face)  [] EVAL (HIGH) 50092 (typically 45 minutes face-to-face)  [] RE-EVAL     [x] GH(09838) x    2 [] IONTO  [x] NMR (97230) x    1 [] VASO  [] Manual (98359) x     [] Other:  [] TA x      [] Mech Traction (75145)  [] ES(attended) (79766)      [] ES (un) (98410):     GOALS:   Patient stated goal: Eliminate pain in neck/shoulder    [] Progressing: [] Met: [] Not Met: [] Adjusted     Therapist goals for Patient:   Short Term Goals: To be achieved in: 2 weeks  1. Independent in HEP and progression per patient tolerance, in order to prevent re-injury. [] Progressing: [] Met: [] Not Met: [] Adjusted   2. Patient will have a decrease in pain to facilitate improvement in movement, function, and ADLs as indicated by Functional Deficits. [] Progressing: [] Met: [] Not Met: [] Adjusted     Long Term Goals:  To be achieved in: 4 weeks  1. Disability index score of 64 or better on FOTO Neck  to assist with reaching prior level of function. [] Progressing: [] Met: [] Not Met: [] Adjusted  2. Patient will demonstrate increased AROM to cervical spine to WNL and without pain to allow for proper joint functioning as indicated by patients Functional Deficits. [] Progressing: [] Met: [] Not Met: [] Adjusted  Patient will demonstrate an increase in postural awareness and control and activation of  Deep cervical stabilizers to allow for proper functional mobility as indicated by patients Functional Deficits. [] Progressing: [] Met: [] Not Met: [] Adjusted  4. Patient will return to holding her daughter without increased symptoms or restriction. [] Progressing: [] Met: [] Not Met: [] Adjusted  5. Patient will work full day at job without pain in neck/shoulder at end of day. (patient specific functional goal)    [] Progressing: [] Met: [] Not Met: [] Adjusted        Overall Progression Towards Functional goals/ Treatment Progress Update:  [] Patient is progressing as expected towards functional goals listed. [] Progression is slowed due to complexities/Impairments listed. [] Progression has been slowed due to co-morbidities. [x] Plan just implemented, too soon to assess goals progression <30days   [] Goals require adjustment due to lack of progress  [] Patient is not progressing as expected and requires additional follow up with physician  [] Other    Prognosis for POC: [x] Good [] Fair  [] Poor      Patient requires continued skilled intervention: [x] Yes  [] No    Treatment/Activity Tolerance:  [x] Patient able to complete treatment  [] Patient limited by fatigue  [] Patient limited by pain     [] Patient limited by other medical complications  [] Other: Tolerated visit very well today. Full cervical ROM today with minimal pain at end ranges and no cavitations heard during thoracic manipulation.  Progressed strength program to both scapular muscles and cervical deep neck flexors. HEP updated and reviewed. Will return in two weeks for progress note and possible discharge. PLAN: See eval  [] Continue per plan of care [] Alter current plan (see comments above)  [x] Plan of care initiated [] Hold pending MD visit [] Discharge      Electronically signed by:  Laura Mcmillan, PT, DPT, OCS    Note: If patient does not return for scheduled/ recommended follow up visits, this note will serve as a discharge from care along with most recent update on progress.

## 2023-01-20 NOTE — PROGRESS NOTES
Daily Progress Note   Hoa Christensen MD      1/20/2023    Chief Complaint   Patient presents with    Procedure     Sclerotherapy. HISTORY OF PRESENT ILLNESS:                The patient is a 44 y.o. female who presents with an office visit for a sclerotherapy procedure visit. SCLEROTHERAPY procedure visit      PRIOR TO TREATMENT confirmed that patient understood the planned procedure and had all questions answered. right left   SPIDERS x x   RETICULAR     VARICOSE           0.5% polidocanol  6  syringes =  6  ml          __x_Patient tolerated procedure well without any allergic reaction or complications, procedure performed in under one hour.     __x_Compression [facility]    __x_Post-Procedure Questions Answered         Past Medical History:   Diagnosis Date    Alopecia     r/t eating disorder    Arthritis     SI joint    ASCUS with positive high risk HPV     Cervical high risk HPV (human papillomavirus) test positive     Closed fracture of shaft of right ulna 06/03/2019    Contact lens/glasses fitting     Contact lens/glasses fitting     Depressive disorder     history anorexia--goes to counseling/ post partum depression    Dysmenorrhea     Encounter for insertion of mirena IUD     History of basal cell carcinoma (BCC) 06/16/2021    Unequal leg length (acquired)        Past Surgical History:   Procedure Laterality Date    LEEP  2/26/2013,8/2012    hsil and hpv+    LIGATION OF SAPHENOUS VEIN Bilateral 9/14/2022    LESSER SAPHENOUS VEIN LIGATION performed by Camryn Jalloh MD at [address] Right 6/6/2019    OPEN REDUCTION INTERNAL FIXATION RIGHT ULNAR SHAFT FRACTURE WITH MINI C-ARM performed by Tamika Rogers MD at [address] Bilateral 9/14/2022    STAB PHLEBECTOMIES BILATERALLY, GREATER THAN 20 performed by Camryn Jalloh MD at [address] History     Socioeconomic History    Marital status: Single     Spouse name: Not on file    Number of children: Not on file    Years of education: Not on file    Highest education level: Not on file   Occupational History    Not on file   Tobacco Use    Smoking status: Every Day     Packs/day: 0.25     Years: 22.00     Pack years: 5.50     Types: Cigarettes    Smokeless tobacco: Never    Tobacco comments:     encouraged to quit/6/6/13, cessation 1/15/14/cessasion 11/15   Vaping Use    Vaping Use: Every day    Substances: Nicotine (6 mg), Flavoring   Substance and Sexual Activity    Alcohol use: Yes     Comment: 1-2 drinks/wk.     Drug use: Yes     Types: Marijuana Norval Remak)     Comment: rare    Sexual activity: Yes     Partners: Male   Other Topics Concern    Not on file   Social History Narrative    Not on file     Social Determinants of Health     Financial Resource Strain: Low Risk     Difficulty of Paying Living Expenses: Not very hard   Food Insecurity: No Food Insecurity    Worried About Running Out of Food in the Last Year: Never true    Ran Out of Food in the Last Year: Never true   Transportation Needs: Not on file   Physical Activity: Not on file   Stress: Not on file   Social Connections: Not on file   Intimate Partner Violence: Not on file   Housing Stability: Not on file       Family History   Problem Relation Age of Onset    No Known Problems Mother     Diabetes Father     Heart Disease Father     Cirrhosis Father     Cancer Maternal Grandmother         breast ca- dx in 62s    Heart Disease Maternal Grandmother     Heart Disease Maternal Grandfather     Parkinsonism Paternal Grandmother     Heart Disease Paternal Grandfather     Rheum Arthritis Neg Hx     Osteoarthritis Neg Hx     Asthma Neg Hx     Breast Cancer Neg Hx     Heart Failure Neg Hx     High Cholesterol Neg Hx     Hypertension Neg Hx     Migraines Neg Hx     Ovarian Cancer Neg Hx     Rashes/Skin Problems Neg Hx     Seizures Neg Hx     Stroke Neg Hx     Thyroid Disease Neg Hx          Current Outpatient Medications: busPIRone (BUSPAR) 5 MG tablet, Take 1 tablet by mouth 3 times daily, Disp: 90 tablet, Rfl: 0    Multiple Vitamins-Minerals (THERAPEUTIC MULTIVITAMIN-MINERALS) tablet, Take 1 tablet by mouth in the morning., Disp: , Rfl:     valACYclovir (VALTREX) 500 MG tablet, Take 1 tablet by mouth daily, Disp: 90 tablet, Rfl: 3    Patient has no known allergies. Vitals:    01/20/23 1436   Weight: 118 lb (53.5 kg)       Orders Only on 01/16/2023   Component Date Value Ref Range Status    Protime 01/16/2023 13.3  11.7 - 14.5 sec Final    Comment: Effective 5-25-22 09:00am EST  Please note reference ranges have  changed for PT and INR Testing. INR 01/16/2023 1.02  0.87 - 1.14 Final    Comment: Effective 5/25/22 at 09:00am EST    Normal: 0.87 - 1.14  Therapeutic: 2.0 - 3.0  Pros. Valve: 2.5 - 3.5  AMI: 2.0 - 3.0      aPTT 01/16/2023 29.3  23.0 - 34.3 sec Final    Comment: Therapeutic range: 73.0 - 102.0 sec    Effective 5-25-22 9:00am EST  Please note reference ranges have  changed for PTT Testing.       WBC 01/16/2023 7.7  4.0 - 11.0 K/uL Final    RBC 01/16/2023 4.19  4.00 - 5.20 M/uL Final    Hemoglobin 01/16/2023 13.2  12.0 - 16.0 g/dL Final    Hematocrit 01/16/2023 38.8  36.0 - 48.0 % Final    MCV 01/16/2023 92.5  80.0 - 100.0 fL Final    MCH 01/16/2023 31.5  26.0 - 34.0 pg Final    MCHC 01/16/2023 34.0  31.0 - 36.0 g/dL Final    RDW 01/16/2023 12.2 (A)  12.4 - 15.4 % Final    Platelets 94/43/4930 228  135 - 450 K/uL Final    MPV 01/16/2023 8.2  5.0 - 10.5 fL Final    Neutrophils % 01/16/2023 65.7  % Final    Lymphocytes % 01/16/2023 27.1  % Final    Monocytes % 01/16/2023 5.7  % Final    Eosinophils % 01/16/2023 0.9  % Final    Basophils % 01/16/2023 0.6  % Final    Neutrophils Absolute 01/16/2023 5.1  1.7 - 7.7 K/uL Final    Lymphocytes Absolute 01/16/2023 2.1  1.0 - 5.1 K/uL Final    Monocytes Absolute 01/16/2023 0.4  0.0 - 1.3 K/uL Final    Eosinophils Absolute 01/16/2023 0.1  0.0 - 0.6 K/uL Final    Basophils Absolute 01/16/2023 0.0  0.0 - 0.2 K/uL Final       Review of Systems   Constitutional: Negative. HENT: Negative. Eyes: Negative. Respiratory: Negative. Gastrointestinal: Negative. Endocrine: Negative. Genitourinary: Negative. Musculoskeletal: Negative. Skin: Negative. Allergic/Immunologic: Negative. Neurological: Negative. Hematological: Negative. Psychiatric/Behavioral: Negative. All other systems reviewed and are negative. Physical Exam  Vitals and nursing note reviewed. Constitutional:       Appearance: Normal appearance. She is well-developed. HENT:      Mouth/Throat:      Pharynx: No oropharyngeal exudate. Eyes:      Conjunctiva/sclera: Conjunctivae normal.      Pupils: Pupils are equal, round, and reactive to light. Cardiovascular:      Rate and Rhythm: Normal rate and regular rhythm. Pulses:           Carotid pulses are 2+ on the right side and 2+ on the left side. Radial pulses are 2+ on the right side and 2+ on the left side. Femoral pulses are 2+ on the right side and 2+ on the left side. Dorsalis pedis pulses are 2+ on the right side and 2+ on the left side. Posterior tibial pulses are 2+ on the right side and 2+ on the left side. Heart sounds: Normal heart sounds. Comments:   MEASUREMENTS 05/06/22:    RIGHT ANKLE: 19.1 cm   RIGHT CALF: 32.4 cm     LEFT ANKLE: 19.8 cm   LEFT CALF: 32.8 cm     MEASUREMENTS 8/24/2021:    RIGHT ANKLE: 29.0 cm   RIGHT CALF: 31.5 cm     LEFT ANKLE: 29.5 cm   LEFT CALF: 32.0 cm     Pulmonary:      Effort: Pulmonary effort is normal.      Breath sounds: Normal breath sounds. Chest:       Abdominal:      General: Bowel sounds are normal.       Musculoskeletal:         General: Normal range of motion. Arms:       Cervical back: Normal range of motion. Legs:    Neurological:      Mental Status: She is alert and oriented to person, place, and time.       Deep Tendon Reflexes: Reflexes are normal and symmetric. Psychiatric:         Speech: Speech normal.         Behavior: Behavior normal.         Thought Content: Thought content normal.         Judgment: Judgment normal.         ASSESSMENT:    Problem List Items Addressed This Visit          Medium    Varicose veins of bilateral lower extremities with other complications - Primary    Spider varicose veins   Patient was placed in supine position and then rotated side to side and even onto her abdomen to get best exposure of her spider and blue reticular veins. 5 syringes total of foamed polidocanol were used to sclerose multiple spider veins going up and down the leg medial lateral anterior posterior patient held still well tolerated the procedure well with some reports of pain. Each site of the needle was removed was was compressed with cotton balls and tape and at the end was wrapped with Ace wraps    PLAN:    FOLLOW UP: Silk tape and cotton balls applied with pressure and surgical stockings applied. Patient has been advised  she may remove the silk tape and cotton balls in   24 hours, but should continue to wear the surgical stockings daily for a week, removing at night. Avoid strenuous activity, sun exposure, and hot baths/showers for the next 3 weeks. General strategy is to avoid any activity or exposure that would invoke perspiration. Schedule to return in 8 weeks. Mahogany Johnson MA am scribing for and in the presence of Ameena Higgins MD on this date of 01/20/23    I Heidy Coffey MD personally performed the services described in this documentation as scribed by the Medical Assistant Hoa Vicente in my presence and it is both accurate and complete.       Electronically signed by Ameena Higgins MD on 1/20/2023 at 4:22 PM Poor

## 2023-02-03 ENCOUNTER — HOSPITAL ENCOUNTER (OUTPATIENT)
Dept: PHYSICAL THERAPY | Age: 38
Setting detail: THERAPIES SERIES
Discharge: HOME | End: 2023-02-03
Payer: MEDICARE

## 2023-02-03 PROCEDURE — 97140 MANUAL THERAPY 1/> REGIONS: CPT

## 2023-02-03 PROCEDURE — 97110 THERAPEUTIC EXERCISES: CPT

## 2023-02-03 PROCEDURE — 97112 NEUROMUSCULAR REEDUCATION: CPT

## 2023-02-03 NOTE — PLAN OF CARE
[address]     Physical Therapy Re-Certification Plan of Care    Dear  Dr. Lius Alfredo Hardy,    We had the pleasure of treating the following patient for physical therapy services at [address] [place] [address]. A summary of our findings can be found in the updated assessment below. This includes our plan of care. If you have any questions or concerns regarding these findings, please do not hesitate to contact me at [phone]. Thank you for the referral.     Physician Signature:________________________________Date:__________________  By signing above (or electronic signature), therapists plan is approved by physician      Overall Response to Treatment:   [x]Patient is responding well to treatment and improvement is noted with regards  to goals   []Patient should continue to improve in reasonable time if they continue HEP   []Patient has plateaued and is no longer responding to skilled PT intervention    []Patient is getting worse and would benefit from return to referring MD   []Patient unable to adhere to initial POC   [x]Other: doing  a lot better. Full strength in BUE. Pain only present at end range left rotation, left side bend or cervical extension. Closing restriction present C5-C7. Improves with manual PA and UPAs int his area. Encouraged different techniques for this at home. Progressed postural program to include prone chin tucks on SB and scap strengthening on SB. Return in two weeks If necessary otherwise this note will serve as discharge note.      Date range of Visits: 23-2/3/23    Total Visits: 3    Physical Therapy Daily Treatment Note  Date:  2/3/2023    Patient Name:  Janett Arevalo    :  1985  MRN: [id number]  Restrictions/Precautions:    Medical/Treatment Diagnosis Information:  Diagnosis: M54.2, G89.29 (ICD-10-CM) - Chronic neck pain  Treatment Diagnosis: M54.2 Cervical pain; M54.6 Thoracic Pain;  Insurance/Certification information:  PT Insurance Information: [place]; 27 VPCY;no auth; no copay  Physician Information:  Referring Provider (secondary): SAGE Diaz  Has the plan of care been signed (Y/N):        [x]  Yes  []  No     Date of Patient follow up with Physician: Not scheduled      Is this a Progress Report:     []  Yes  [x]  No        If Yes:  Date Range for reporting period:  Beginnin23  Endin/3/23    Progress report will be due (10 Rx or 30 days whichever is less): 86       Recertification will be due (POC Duration  / 90 days whichever is less): 3/3/23      Visit # Insurance Allowable Auth Required   4 30 VPCY []  Yes [x]  No      OUTCOME MEASURE DATE SCORE   FOTO Cervical 23 56   FOTO Cervical 2/3/23  73        Latex Allergy:  [x]NO      []YES  Preferred Language for Healthcare:   [x]English       []other:    Pain level:  0/10 at rest, 5/10 at worst if she extends or rotates left    SUBJECTIVE: Doing well. Tightness has improved and overall she feels 75% better. Did HEP 3x last week without issues. Still gets a pinching pain lower left side of neck with extension and/or left rotation.         OBJECTIVE:      2/3/23  ROM AROM Comments   Flexion 55/ No pain today   Extension 78 / Pain left side of neck \"pinch\"   Side bend R 44/No pain or tightness   Side bend L 50 / Pain left side of neck \"pinch\"   Rotation R 55     Rotation L 55 Pain at end range, pinching lower left brittney eneck   Shoulder AROM clearing WNL no pain all planes                23  Strength L R Comments   Neck flexion (C1-2)         Neck side bend (C3)         Shoulder elevation (C4)         Shoulder abduction (C5) 5 5     Shoulder flexion 5 5     Shoulder IR 5 5     Shoulder ER 5 5     Elbow flexion and /or wrist extension  (C6) 5 5     Elbow extension and/or wrist flexion  (C7) 5 5     Thumb extension and/or ulnar deviation ((C8) 5 5     Abduction and /or adduction of hand intrinsics (T1) 5 5                  2/3/23  Special Test Results/Comment   Spurling's No pain   Cervical Distraction Neg   ULTT 1-Median     ULTT 2-Median     ULTT 3-Radial     ULTT 4-Ulnar     Flexion Rotation Test     Neck Flexor Muscle Endurance Test     [employer] C-Spine Rules Neg   Alar Ligament Testing     Tawanda Median Test     Adson's Test        1/6/23  Dermatomes Normal Abnormal Comments   Superior head (C1)  x       Occiput (C2) x       Lateral neck (C3) x       AC joint (C4) x       Lateral shoulder (C5) x       Distal thumb (C6) x       Middle finger (C7) x       Distal 5th digit (C8) x       Inner forearm (T1) x       Inner upper arm (T2) x          1/6/23  Reflexes Normal Abnormal Comments   S1-2 Seated achilles         S1-2 Prone knee bend         L3-4 Patellar tendon         C5-6 Biceps x       C6 Brachioradialis x       C7-8 Triceps x       Clonus         Babinski         Yang's x             Joint mobility:               []Normal:               [x]Hypo: Central and UPAs at C5-C7 left side. 2/3/23              []Hyper     Palpation:  none today 2/3/23     Functional Mobility/Transfers: ind     Posture: slouched, forward head     Bandages/Dressings/Incisions: n/a     Gait: (include devices/WB status): WNL      Orthopedic Special Tests: see above.       RESTRICTIONS/PRECAUTIONS: None  Exercises/Interventions:   Exercise/Equipment Resistance/Repetitions Other comments   Stretching/PROM           Cat Camel X10 quadruped    Thoracic thread the needle X10 each way in quadruped          Chin Tuck + Lift 2x10 + 5\" holds    Chin tuck 10x5\" holds Prone on SB                   Isometrics     Retraction     shrugs     Cervical Flexion      Cervical Extension     Cervical sidebending               PRE's     External Rotation     Internal Rotation              Serratus wall slide 1x10 foam roll  1x10 pillow case    Biceps     Triceps     Shrugs     Horizontal Abd with ER     Reverse Flys     Prone Extension 3x10; 1# On SB   Prone T 3x10 On SB      Flexion     Abduction     Wall Nikko 3x10                   Cable Column/Theraband     Scapular Retraction     External Rotation     Internal Rotation     Ext     TRIC     Lats     Shrugs     Flex     BIC     No Money 3x10 Red Cues to coordinate with breathing              Manual Intervention        Therapeutic Exercise and NMR EXR  [x] (79919) Provided verbal/tactile cueing for activities related to strengthening, flexibility, endurance, ROM  for improvements in cervical, postural, scapular, scapulothoracic and UE control with self care, reaching, carrying, lifting, house/yardwork, driving/computer work. [x] (62758) Provided verbal/tactile cueing for activities related to improving balance, coordination, kinesthetic sense, posture, motor skill, proprioception  to assist with cervical, scapular, scapulothoracic and UE control with self care, reaching, carrying, lifting, house/yardwork, driving/computer work. Therapeutic Activities:    [x] (43586 or 06602) Provided verbal/tactile cueing for activities related to improving balance, coordination, kinesthetic sense, posture, motor skill, proprioception and motor activation to allow for proper function of cervical, scapular, scapulothoracic and UE control with self care, carrying, lifting, driving/computer work. Home Exercise Program:    [x] (93252) Reviewed/Progressed HEP activities related to strengthening, flexibility, endurance, ROM of cervical, scapular, scapulothoracic and UE control with self care, reaching, carrying, lifting, house/yardwork, driving/computer work  [] (32078) Reviewed/Progressed HEP activities related to improving balance, coordination, kinesthetic sense, posture, motor skill, proprioception of cervical, scapular, scapulothoracic and UE control with self care, reaching, carrying, lifting, house/yardwork, driving/computer work    HEP:  Patient instructed on HEP on this date with handout provided and all questions answered. Discussions about how to progress sets/reps/resistance as necessary for fatigue and challenge. Patient was instructed to contact PT with any questions or concerns about HEP moving forward. Patient verbally stated she/he understood. Access Code: [email or web address]  URL: [email or web address]  Date: 01/20/2023  Prepared by:  [employer]    Exercises  Seated Thoracic Self-Mobilization - 1 x daily - 7 x weekly - 1-3 sets - 10 reps  Thoracic Mobilization on Foam Roll - Hands Clasped - 1 x daily - 7 x weekly - 1-3 sets - 10 reps  Supine Thoracic Mobilization Towel Roll Vertical with Arm Stretch - 1 x daily - 7 x weekly - 3 sets - 30 hold  Quadruped Cat Cow - 1 x daily - 7 x weekly - 1-3 sets - 10 reps  Quadruped Full Range Thoracic Rotation with Reach - 1 x daily - 7 x weekly - 1-3 sets - 10 reps  First Rib Mobilization with Strap - 1 x daily - 7 x weekly - 1-3 sets - 10 reps  Supine Belly Breathing with Hands on Belly and Chest - 1 x daily - 7 x weekly - 1-2 minute  Gentle Levator Scapulae Stretch - 1 x daily - 7 x weekly - 3 sets - 30 hold  Seated Gentle Upper Trapezius Stretch - 1 x daily - 7 x weekly - 3 sets - 30 hold  Supine Chin Tuck - 1 x daily - 3 x weekly - 2 sets - 10 reps  Supine Deep Neck Flexor Training - Repetitions - 1 x daily - 3 x weekly - 2-3 sets - 10 reps  Prone Shoulder Extension - 1 x daily - 3 x weekly - 3 sets - 10 reps  Prone Scapular Retraction Arms at Side - 1 x daily - 3 x weekly - 3 sets - 10 reps  Prone Scapular Retraction with Hand Behind Head - 1 x daily - 3 x weekly - 3 sets - 5-10 reps  Supine Scapular Protraction in Flexion with Dumbbells - 1 x daily - 3 x weekly - 3 sets - 10 reps  Quadruped Scapular Protraction and Retraction - 1 x daily - 3 x weekly - 3 sets - 10 reps  Wall Push Up with Plus - 1 x daily - 3 x weekly - 3 sets - 10 reps  Shoulder External Rotation and Scapular Retraction with Resistance - 1 x daily - 3 x weekly - 3 sets - 10 reps  Wall [place] - 1 x daily - 3 x weekly - 3 sets - 10 reps        Manual Treatments:  PROM / STM / Oscillations-Mobs:  G-I, II, III, IV (PA's, Inf., Post.)  [x] (04624) Provided manual therapy to mobilize soft tissue/joints of cervical/CT, scapular GHJ and UE for the purpose of decreasing headache, modulating pain, promoting relaxation,  increasing ROM, reducing/eliminating soft tissue swelling/inflammation/restriction, improving soft tissue extensibility and allowing for proper ROM for normal function with self care, reaching, carrying, lifting, house/yardwork, driving/computer work    Modalities:      Charges:  Timed Code Treatment Minutes: 45   Total Treatment Minutes: 45       [] EVAL (LOW) 10037 (typically 20 minutes face-to-face)  [] EVAL (MOD) 02700 (typically 30 minutes face-to-face)  [] EVAL (HIGH) 55557 (typically 45 minutes face-to-face)  [] RE-EVAL     [x] JV(22395) x    1 [] IONTO  [x] NMR (10546) x    1 [] VASO  [x] Manual (66963) x   1  [] Other:  [] TA x      [] Mech Traction (67113)  [] ES(attended) (81107)      [] ES (un) (28169):     GOALS:   Patient stated goal: Eliminate pain in neck/shoulder    [x] Progressing: [] Met: [] Not Met: [] Adjusted     Therapist goals for Patient:   Short Term Goals: To be achieved in: 2 weeks  1. Independent in HEP and progression per patient tolerance, in order to prevent re-injury. [] Progressing: [x] Met: [] Not Met: [] Adjusted   2. Patient will have a decrease in pain to facilitate improvement in movement, function, and ADLs as indicated by Functional Deficits. [] Progressing: [x] Met: [] Not Met: [] Adjusted     Long Term Goals: To be achieved in: 4 weeks  1. Disability index score of 64 or better on FOTO Neck  to assist with reaching prior level of function. [] Progressing: [x] Met: [] Not Met: [] Adjusted  2. Patient will demonstrate increased AROM to cervical spine to WNL and without pain to allow for proper joint functioning as indicated by patients Functional Deficits.      [x] Progressing: [] Met: [] Not Met: [] Adjusted  Patient will demonstrate an increase in postural awareness and control and activation of  Deep cervical stabilizers to allow for proper functional mobility as indicated by patients Functional Deficits. [] Progressing: [x] Met: [] Not Met: [] Adjusted  4. Patient will return to holding her daughter without increased symptoms or restriction. [] Progressing: [x] Met: [] Not Met: [] Adjusted  5. Patient will work full day at job without pain in neck/shoulder at end of day. (patient specific functional goal)    [] Progressing: [x] Met: [] Not Met: [] Adjusted        Overall Progression Towards Functional goals/ Treatment Progress Update:  [x] Patient is progressing as expected towards functional goals listed. [] Progression is slowed due to complexities/Impairments listed. [] Progression has been slowed due to co-morbidities. [] Plan just implemented, too soon to assess goals progression <30days   [] Goals require adjustment due to lack of progress  [] Patient is not progressing as expected and requires additional follow up with physician  [] Other    Prognosis for POC: [x] Good [] Fair  [] Poor      Patient requires continued skilled intervention: [x] Yes  [] No    Treatment/Activity Tolerance:  [x] Patient able to complete treatment  [] Patient limited by fatigue  [] Patient limited by pain     [] Patient limited by other medical complications  [] Other:  See above. PLAN: Return to PT in 2 weeks if needed 2/3/23  [x] Continue per plan of care [] Alter current plan (see comments above)  [] Plan of care initiated [] Hold pending MD visit [] Discharge      Electronically signed by:  Nenita Land, PT, DPT, OCS    Note: If patient does not return for scheduled/ recommended follow up visits, this note will serve as a discharge from care along with most recent update on progress.

## 2023-02-14 ENCOUNTER — HOSPITAL ENCOUNTER (OUTPATIENT)
Dept: GENERAL RADIOLOGY | Age: 38
Discharge: HOME | End: 2023-02-14
Payer: MEDICAID

## 2023-02-14 ENCOUNTER — HOSPITAL ENCOUNTER (OUTPATIENT)
Age: 38
Discharge: HOME | End: 2023-02-14
Payer: MEDICAID

## 2023-02-14 DIAGNOSIS — R23.2 HOT FLASHES: ICD-10-CM

## 2023-02-14 PROCEDURE — 71046 X-RAY EXAM CHEST 2 VIEWS: CPT

## 2023-02-17 ENCOUNTER — APPOINTMENT (OUTPATIENT)
Dept: PHYSICAL THERAPY | Age: 38
End: 2023-02-17
Payer: MEDICAID

## 2023-02-22 ENCOUNTER — HOSPITAL ENCOUNTER (OUTPATIENT)
Dept: PHYSICAL THERAPY | Age: 38
Setting detail: THERAPIES SERIES
Discharge: HOME | End: 2023-02-22
Payer: MEDICAID

## 2023-02-22 PROCEDURE — 97112 NEUROMUSCULAR REEDUCATION: CPT

## 2023-02-22 PROCEDURE — 97140 MANUAL THERAPY 1/> REGIONS: CPT

## 2023-02-22 PROCEDURE — 97110 THERAPEUTIC EXERCISES: CPT

## 2023-02-22 NOTE — FLOWSHEET NOTE
[address] [facility], [place]        Physical Therapy Daily Treatment Note  Date:  2023    Patient Name:  Simon Charles    :  1985  MRN: [id number]  Restrictions/Precautions:    Medical/Treatment Diagnosis Information:  Diagnosis: M54.2, G89.29 (ICD-10-CM) - Chronic neck pain  Treatment Diagnosis: M54.2 Cervical pain; M54.6 Thoracic Pain; Insurance/Certification information:  PT Insurance Information: [employer]; 27 VPCY;no auth; no copay  Physician Information:  Referring Provider (secondary): SAGE Forbes  Has the plan of care been signed (Y/N):        [x]  Yes  []  No     Date of Patient follow up with Physician: Not scheduled      Is this a Progress Report:     []  Yes  [x]  No        If Yes:  Date Range for reporting period:  Beginnin23  Ending:     Progress report will be due (10 Rx or 30 days whichever is less): 18       Recertification will be due (POC Duration  / 90 days whichever is less): 3/3/23      Visit # Insurance Allowable Auth Required   5 30 VPCY []  Yes [x]  No      OUTCOME MEASURE DATE SCORE   FOTO Cervical 23 56   FOTO Cervical 2/3/23  73        Latex Allergy:  [x]NO      []YES  Preferred Language for Healthcare:   [x]English       []other:    Pain level:  0/10 at rest, 5/10 at worst if she extends or rotates left    SUBJECTIVE: Continues to do well overall. Does feel she slept wrong last night and did shoulder workout yesterday. Soreness more muscular today than axial spine pain. Still getting pinching pain with left rotation/extension movement and is \"slightly\" better. Extension easier than rotation.          OBJECTIVE:      23  ROM AROM Comments   Flexion 60/ No pain today   Extension 70 / Pain left side of neck \"pinch\"   Side bend R 50/No pain or tightness   Side bend L 55 / No pain   Rotation R 60     Rotation L 60 Pain at end range, pinching lower left side neck   Shoulder AROM clearing WNL no pain all planes                1/6/23  Strength L R Comments   Neck flexion (C1-2)         Neck side bend (C3)         Shoulder elevation (C4)         Shoulder abduction (C5) 5 5     Shoulder flexion 5 5     Shoulder IR 5 5     Shoulder ER 5 5     Elbow flexion and /or wrist extension  (C6) 5 5     Elbow extension and/or wrist flexion  (C7) 5 5     Thumb extension and/or ulnar deviation ((C8) 5 5     Abduction and /or adduction of hand intrinsics (T1) 5 5                  2/3/23  Special Test Results/Comment   Spurling's No pain   Cervical Distraction Neg   ULTT 1-Median     ULTT 2-Median     ULTT 3-Radial     ULTT 4-Ulnar     Flexion Rotation Test     Neck Flexor Muscle Endurance Test     [employer] C-Spine Rules Neg   Alar Ligament Testing     Nyoka Mons Test     Adson's Test        1/6/23  Dermatomes Normal Abnormal Comments   Superior head (C1)  x       Occiput (C2) x       Lateral neck (C3) x       AC joint (C4) x       Lateral shoulder (C5) x       Distal thumb (C6) x       Middle finger (C7) x       Distal 5th digit (C8) x       Inner forearm (T1) x       Inner upper arm (T2) x          1/6/23  Reflexes Normal Abnormal Comments   S1-2 Seated achilles         S1-2 Prone knee bend         L3-4 Patellar tendon         C5-6 Biceps x       C6 Brachioradialis x       C7-8 Triceps x       Clonus         Babinski         Yang's x             Joint mobility:               []Normal:               [x]Hypo: Central and UPAs at C5-C7 left side. 2/3/23              []Hyper     Palpation:  none today 2/3/23     Functional Mobility/Transfers: ind     Posture: slouched, forward head     Bandages/Dressings/Incisions: n/a     Gait: (include devices/WB status): WNL      Orthopedic Special Tests: see above.       RESTRICTIONS/PRECAUTIONS: None  Exercises/Interventions:   Exercise/Equipment Resistance/Repetitions Other comments   Stretching/PROM           Cat Camel X10 quadruped       Side lying open book 10x10\"ea       Chin Tuck + Lift 2x10 + 5\" holds    Chin tuck 10x5\" holds Prone on SB                   Isometrics     Retraction     shrugs     Cervical Flexion      Cervical Extension     Cervical sidebending               PRE's     External Rotation     Internal Rotation                 Biceps     Triceps     Shrugs     Horizontal Abd with ER     Reverse Flys        Flexion     Abduction                       Cable Column/Theraband     Scapular Retraction     External Rotation     Internal Rotation     Ext     TRIC     Lats     Shrugs     Flex     BIC          Horizontal Abduction 2x10 Red Supine   D2 PNF 2x10 Red Supine              Manual Intervention     Hypervolt 3' LS left side   Cerv mobs/manip 5' Left side glides in extension C5-C7;  and UPAs C5-C7     Therapeutic Exercise and NMR EXR  [x] (37207) Provided verbal/tactile cueing for activities related to strengthening, flexibility, endurance, ROM  for improvements in cervical, postural, scapular, scapulothoracic and UE control with self care, reaching, carrying, lifting, house/yardwork, driving/computer work. [x] (70477) Provided verbal/tactile cueing for activities related to improving balance, coordination, kinesthetic sense, posture, motor skill, proprioception  to assist with cervical, scapular, scapulothoracic and UE control with self care, reaching, carrying, lifting, house/yardwork, driving/computer work. Therapeutic Activities:    [x] (16877 or 25822) Provided verbal/tactile cueing for activities related to improving balance, coordination, kinesthetic sense, posture, motor skill, proprioception and motor activation to allow for proper function of cervical, scapular, scapulothoracic and UE control with self care, carrying, lifting, driving/computer work.      Home Exercise Program:    [x] (41938) Reviewed/Progressed HEP activities related to strengthening, flexibility, endurance, ROM of cervical, scapular, scapulothoracic and UE control with self care, reaching, carrying, lifting, house/yardwork, driving/computer work  [] (76215) Reviewed/Progressed HEP activities related to improving balance, coordination, kinesthetic sense, posture, motor skill, proprioception of cervical, scapular, scapulothoracic and UE control with self care, reaching, carrying, lifting, house/yardwork, driving/computer work    HEP:  Patient instructed on HEP on this date with handout provided and all questions answered. Discussions about how to progress sets/reps/resistance as necessary for fatigue and challenge. Patient was instructed to contact PT with any questions or concerns about HEP moving forward. Patient verbally stated she/he understood. Access Code: [email or web address]  URL: [email or web address]  Date: 02/22/2023  Prepared by:  [employer]    Exercises  Seated Thoracic Self-Mobilization - 1 x daily - 7 x weekly - 1-3 sets - 10 reps  Thoracic Mobilization on Foam Roll - Hands Clasped - 1 x daily - 7 x weekly - 1-3 sets - 10 reps  Supine Thoracic Mobilization Towel Roll Vertical with Arm Stretch - 1 x daily - 7 x weekly - 3 sets - 30 hold  Quadruped Cat Cow - 1 x daily - 7 x weekly - 1-3 sets - 10 reps  Quadruped Full Range Thoracic Rotation with Reach - 1 x daily - 7 x weekly - 1-3 sets - 10 reps  Sidelying Thoracic Rotation with Open Book - 1 x daily - 7 x weekly - 10 reps - 10 hold  Gentle Levator Scapulae Stretch - 1 x daily - 7 x weekly - 3 sets - 30 hold  Seated Gentle Upper Trapezius Stretch - 1 x daily - 7 x weekly - 3 sets - 30 hold  Supine Deep Neck Flexor Training - Repetitions - 1 x daily - 3 x weekly - 2-3 sets - 10 reps  Supine Scapular Protraction in Flexion with Dumbbells - 1 x daily - 3 x weekly - 3 sets - 10 reps  Wall Push Up with Plus - 1 x daily - 3 x weekly - 3 sets - 10 reps  Serratus Activation at Wall with Foam Roll and Resistance Band - 1 x daily - 3 x weekly - 3 sets - 10 reps  Shoulder External Rotation and Scapular Retraction with Resistance - 1 x daily - 3 x weekly - 3 sets - 10 reps  Wall Corrigan - 1 x daily - 3 x weekly - 3 sets - 10 reps  Prone Single Arm Shoulder Extension on Swiss Ball - 1 x daily - 3 x weekly - 3 sets - 10 reps  Prone Middle Trapezius Strengthening on Swiss Ball - 1 x daily - 3 x weekly - 3 sets - 10 reps  Prone Scapular Retraction with Hand Behind Head - 1 x daily - 3 x weekly - 3 sets - 5-10 reps  Supine Shoulder Horizontal Abduction with Resistance - 1 x daily - 3 x weekly - 3 sets - 10 reps  Supine PNF D2 Flexion with Resistance - 1 x daily - 3 x weekly - 3 sets - 10 reps        Manual Treatments:  PROM / STM / Oscillations-Mobs:  G-I, II, III, IV (PA's, Inf., Post.)  [x] (28022) Provided manual therapy to mobilize soft tissue/joints of cervical/CT, scapular GHJ and UE for the purpose of decreasing headache, modulating pain, promoting relaxation,  increasing ROM, reducing/eliminating soft tissue swelling/inflammation/restriction, improving soft tissue extensibility and allowing for proper ROM for normal function with self care, reaching, carrying, lifting, house/yardwork, driving/computer work    Modalities:      Charges:  Timed Code Treatment Minutes: 40   Total Treatment Minutes: 40   [id number]    [] EVAL (LOW) 28306 (typically 20 minutes face-to-face)  [] EVAL (MOD) 90406 (typically 30 minutes face-to-face)  [] EVAL (HIGH) 81543 (typically 45 minutes face-to-face)  [] RE-EVAL     [x] IS(27280) x    1 [] IONTO  [x] NMR (42006) x    1 [] VASO  [x] Manual (36492) x   1  [] Other:  [] TA x      [] Mech Traction (33629)  [] ES(attended) (21688)      [] ES (un) (84497):     GOALS:   Patient stated goal: Eliminate pain in neck/shoulder    [x] Progressing: [] Met: [] Not Met: [] Adjusted     Therapist goals for Patient:   Short Term Goals: To be achieved in: 2 weeks  1. Independent in HEP and progression per patient tolerance, in order to prevent re-injury. [] Progressing: [x] Met: [] Not Met: [] Adjusted   2.  Patient will have a decrease in pain to facilitate improvement in movement, function, and ADLs as indicated by Functional Deficits. [] Progressing: [x] Met: [] Not Met: [] Adjusted     Long Term Goals: To be achieved in: 4 weeks  1. Disability index score of 64 or better on FOTO Neck  to assist with reaching prior level of function. [] Progressing: [x] Met: [] Not Met: [] Adjusted  2. Patient will demonstrate increased AROM to cervical spine to WNL and without pain to allow for proper joint functioning as indicated by patients Functional Deficits. [x] Progressing: [] Met: [] Not Met: [] Adjusted  Patient will demonstrate an increase in postural awareness and control and activation of  Deep cervical stabilizers to allow for proper functional mobility as indicated by patients Functional Deficits. [] Progressing: [x] Met: [] Not Met: [] Adjusted  4. Patient will return to holding her daughter without increased symptoms or restriction. [] Progressing: [x] Met: [] Not Met: [] Adjusted  5. Patient will work full day at job without pain in neck/shoulder at end of day. (patient specific functional goal)    [] Progressing: [x] Met: [] Not Met: [] Adjusted        Overall Progression Towards Functional goals/ Treatment Progress Update:  [x] Patient is progressing as expected towards functional goals listed. [] Progression is slowed due to complexities/Impairments listed. [] Progression has been slowed due to co-morbidities.   [] Plan just implemented, too soon to assess goals progression <30days   [] Goals require adjustment due to lack of progress  [] Patient is not progressing as expected and requires additional follow up with physician  [] Other    Prognosis for POC: [x] Good [] Fair  [] Poor      Patient requires continued skilled intervention: [x] Yes  [] No    Treatment/Activity Tolerance:  [x] Patient able to complete treatment  [] Patient limited by fatigue  [] Patient limited by pain     [] Patient limited by other medical complications  [] Other: Tolerated visit well again today. ROM improved in all planes and only painful now on end range extension and end range left rotation. Did have mild TTP as LS insertion today from workout yesterday but this resolved with STM using Hypervolt. Improved mobility with left cervical side glides in extension C5-C7 today compared to two weeks ago. Stiffness and tenderness remains only with  at C5-C7 levels. Encouraged patient to keep up with ROM and mobilization techniques at home specifically thoracic spine and extension bias side glides to lower cervical spine. Updated HEP with instructions on strengthening 3x/week for neck and scapular muscles. All questions answered. Return in 2 weeks if needed for check up - if patient cancels then this note will serve as discharge summary. She is in agreement with this plan. PLAN: Return to PT in 2 weeks if needed 2/22/23  [x] Continue per plan of care [] Alter current plan (see comments above)  [] Plan of care initiated [] Hold pending MD visit [] Discharge      Electronically signed by:  Jovani Martinez, PT, DPT, OCS    Note: If patient does not return for scheduled/ recommended follow up visits, this note will serve as a discharge from care along with most recent update on progress.

## 2023-03-09 ENCOUNTER — HOSPITAL ENCOUNTER (OUTPATIENT)
Dept: PHYSICAL THERAPY | Age: 38
Setting detail: THERAPIES SERIES
Discharge: HOME | End: 2023-03-09
Payer: MEDICAID

## 2023-03-09 PROCEDURE — 97140 MANUAL THERAPY 1/> REGIONS: CPT

## 2023-03-09 PROCEDURE — 97110 THERAPEUTIC EXERCISES: CPT

## 2023-03-09 NOTE — PLAN OF CARE
[facility] [address] [place] [address]     Physical Therapy Re-Certification Plan of Care    Dear  Epi Kendrick CNP,    We had the pleasure of treating the following patient for physical therapy services at [address] [place] [address]. A summary of our findings can be found in the updated assessment below. This includes our plan of care. If you have any questions or concerns regarding these findings, please do not hesitate to contact me at [phone]. Thank you for the referral.     Physician Signature:________________________________Date:__________________  By signing above (or electronic signature), therapists plan is approved by physician      Overall Response to Treatment:   [x]Patient is responding well to treatment and improvement is noted with regards  to goals   [x]Patient should continue to improve in reasonable time if they continue HEP   []Patient has plateaued and is no longer responding to skilled PT intervention    []Patient is getting worse and would benefit from return to referring MD   []Patient unable to adhere to initial POC   [x]Other: Overall has done well in PT. Has been inconsistent with HEP in past few weeks due to work obligations so symptoms have increased slightly. She is able to improve / resolve symptoms with HEP including self massage and stretching techniques. Still slightly weaker in left MT / LT than right which may contribute to some of her pain in this area. Encouraged her to continue with HEP and may return as needed to PT for manual techniques including dry needling should referring provider be agreeable. She is in agreement with this plan. If she does not return, this note will serve as patient's discharge note from PT.     Date range of Visits: 23-3/9/23     Total Visits: 6      Physical Therapy Daily Treatment Note  Date:  3/9/2023    Patient Name:  Tamiko Ling    :  1985  MRN: [id number]  Restrictions/Precautions:    Medical/Treatment Diagnosis Information:  Diagnosis: M54.2, G89.29 (ICD-10-CM) - Chronic neck pain  Treatment Diagnosis: M54.2 Cervical pain; M54.6 Thoracic Pain; Insurance/Certification information:  PT Insurance Information: [place]; 27 VPCY;no auth; no copay  Physician Information:  Referring Provider (secondary): SAGE Lancaster  Has the plan of care been signed (Y/N):        [x]  Yes  []  No     Date of Patient follow up with Physician: Not scheduled      Is this a Progress Report:     [x]  Yes  []  No        If Yes:  Date Range for reporting period:  Beginnin23  Ending: 3/9/23    Progress report will be due (10 Rx or 30 days whichever is less): 3/0/24       Recertification will be due (POC Duration  / 90 days whichever is less): 23      Visit # Insurance Allowable Auth Required   6 30 VPCY []  Yes [x]  No      OUTCOME MEASURE DATE SCORE   FOTO Cervical 23 56   FOTO Cervical 2/3/23 73   NDI 3/9/23 8% Deficit        Latex Allergy:  [x]NO      []YES  Preferred Language for Healthcare:   [x]English       []other:    Pain level:  2/10 at rest, 6-7/10 at worst if she extends or rotates left    SUBJECTIVE: Doing alright. Has not been consistent with HEP in past week due to increased business at work. Overall still has mild stiffness in AM but resolves by mid-morning. Feels tightness increase by the end of the day. If she has time to do her exercises she does feel improvement in symptoms. Admits she needs to buy foam roller as the one at the gym is a lot better than pool noodle she uses at home.       OBJECTIVE:      3/9/23  ROM AROM Comments   Flexion 80/ No pain today   Extension 80 / Pain left side of neck \"pinch\"   Side bend R 44/Contralaterlal tightness   Side bend L 45 / Pinch on left side   Rotation R 60     Rotation L 60 Pain at end range, pinching lower left side neck   Shoulder AROM clearing WNL no pain all planes 3/9/23  Strength L R Comments   Neck flexion (C1-2)         Neck side bend (C3)         Shoulder elevation (C4)         Shoulder abduction (C5) 5 5     Shoulder flexion 5 5     Shoulder IR 5 5     Shoulder ER 5 5     Elbow flexion and /or wrist extension  (C6) 5 5     Elbow extension and/or wrist flexion  (C7) 5 5     Thumb extension and/or ulnar deviation ((C8) 5 5     Abduction and /or adduction of hand intrinsics (T1) 5 5     Lats 4+ 4+    Middle Trap 4- 5     Lower Trap 4- 4       2/3/23  Special Test Results/Comment   Spurling's No pain   Cervical Distraction Neg   ULTT 1-Median     ULTT 2-Median     ULTT 3-Radial     ULTT 4-Ulnar     Flexion Rotation Test     Neck Flexor Muscle Endurance Test     [employer] C-Spine Rules Neg   Alar Ligament Testing     Donna Valderrama Test     Adson's Test        1/6/23  Dermatomes Normal Abnormal Comments   Superior head (C1)  x       Occiput (C2) x       Lateral neck (C3) x       AC joint (C4) x       Lateral shoulder (C5) x       Distal thumb (C6) x       Middle finger (C7) x       Distal 5th digit (C8) x       Inner forearm (T1) x       Inner upper arm (T2) x          1/6/23  Reflexes Normal Abnormal Comments   S1-2 Seated achilles         S1-2 Prone knee bend         L3-4 Patellar tendon         C5-6 Biceps x       C6 Brachioradialis x       C7-8 Triceps x       Clonus         Babinski         Yang's x             Joint mobility:               []Normal:               [x]Hypo: Central and UPAs at T1-T3 left side. 3/9/23              []Hyper     Palpation:  trigger point at LS insertion on left 3/9/23     Functional Mobility/Transfers: ind     Posture: slouched, forward head     Bandages/Dressings/Incisions: n/a     Gait: (include devices/WB status): WNL      Orthopedic Special Tests: see above.       RESTRICTIONS/PRECAUTIONS: None  Exercises/Interventions:   Exercise/Equipment Resistance/Repetitions Other comments   Stretching/PROM           Cat Camel X10 quadruped Upper Trap 3x30\"    Levator Scap 3x30\"    Barrel Stretch 3x30\"         Isometrics     Retraction     shrugs     Cervical Flexion      Cervical Extension     Cervical sidebending               PRE's     External Rotation     Internal Rotation                 Biceps     Triceps     Shrugs     Horizontal Abd with ER     Reverse Flys        Flexion     Abduction                       Cable Column/Theraband     Scapular Retraction     External Rotation     Internal Rotation     Ext     TRIC     Lats     Shrugs     Flex     BIC                     Manual Intervention     Hypervolt 4' LS left side   Cerv mobs/manip 5' Left side glides in extension C5-C7;  and UPAs C5-C7   Thoracic mobs/manip X1 Seated Thrust Towel roll upper T-Spine   CT manip x1      Therapeutic Exercise and NMR EXR  [x] (51709) Provided verbal/tactile cueing for activities related to strengthening, flexibility, endurance, ROM  for improvements in cervical, postural, scapular, scapulothoracic and UE control with self care, reaching, carrying, lifting, house/yardwork, driving/computer work. [x] (03569) Provided verbal/tactile cueing for activities related to improving balance, coordination, kinesthetic sense, posture, motor skill, proprioception  to assist with cervical, scapular, scapulothoracic and UE control with self care, reaching, carrying, lifting, house/yardwork, driving/computer work. Therapeutic Activities:    [x] (48752 or 50250) Provided verbal/tactile cueing for activities related to improving balance, coordination, kinesthetic sense, posture, motor skill, proprioception and motor activation to allow for proper function of cervical, scapular, scapulothoracic and UE control with self care, carrying, lifting, driving/computer work.      Home Exercise Program:    [x] (50543) Reviewed/Progressed HEP activities related to strengthening, flexibility, endurance, ROM of cervical, scapular, scapulothoracic and UE control with self care, reaching, carrying, lifting, house/yardwork, driving/computer work  [] (92828) Reviewed/Progressed HEP activities related to improving balance, coordination, kinesthetic sense, posture, motor skill, proprioception of cervical, scapular, scapulothoracic and UE control with self care, reaching, carrying, lifting, house/yardwork, driving/computer work    HEP:  Patient instructed on HEP on this date with handout provided and all questions answered. Discussions about how to progress sets/reps/resistance as necessary for fatigue and challenge. Patient was instructed to contact PT with any questions or concerns about HEP moving forward. Patient verbally stated she/he understood. Access Code: [email or web address]  URL: [email or web address]  Date: 03/09/2023  Prepared by:  [employer]    Exercises  Seated Thoracic Self-Mobilization - 1 x daily - 7 x weekly - 1-3 sets - 10 reps  Thoracic Mobilization on Foam Roll - Hands Clasped - 1 x daily - 7 x weekly - 1-3 sets - 10 reps  Seated 55 Gallon Barrel Hug Stretch - 1 x daily - 7 x weekly - 3 sets - 10-30 hold  Quadruped Cat Cow - 1 x daily - 7 x weekly - 1-3 sets - 10 reps  Quadruped Full Range Thoracic Rotation with Reach - 1 x daily - 7 x weekly - 1-3 sets - 10 reps  Sidelying Thoracic Rotation with Open Book - 1 x daily - 7 x weekly - 10 reps - 10 hold  Gentle Levator Scapulae Stretch - 1 x daily - 7 x weekly - 3 sets - 30 hold  Seated Gentle Upper Trapezius Stretch - 1 x daily - 7 x weekly - 3 sets - 30 hold  Supine Deep Neck Flexor Training - Repetitions - 1 x daily - 3 x weekly - 2-3 sets - 10 reps  Supine Scapular Protraction in Flexion with Dumbbells - 1 x daily - 3 x weekly - 3 sets - 10 reps  Wall Push Up with Plus - 1 x daily - 3 x weekly - 3 sets - 10 reps  Serratus Activation at Wall with Foam Roll and Resistance Band - 1 x daily - 3 x weekly - 3 sets - 10 reps  Shoulder External Rotation and Scapular Retraction with Resistance - 1 x daily - 3 x weekly - 3 sets - 10 reps  Wall [place] - 1 x daily - 3 x weekly - 3 sets - 10 reps  Prone Single Arm Shoulder Extension on Swiss Ball - 1 x daily - 3 x weekly - 3 sets - 10 reps  Prone Middle Trapezius Strengthening on Swiss Ball - 1 x daily - 3 x weekly - 3 sets - 10 reps  Prone Scapular Retraction with Hand Behind Head - 1 x daily - 3 x weekly - 3 sets - 5-10 reps  Supine Shoulder Horizontal Abduction with Resistance - 1 x daily - 3 x weekly - 3 sets - 10 reps  Supine PNF D2 Flexion with Resistance - 1 x daily - 3 x weekly - 3 sets - 10 reps          Manual Treatments:  PROM / STM / Oscillations-Mobs:  G-I, II, III, IV (PA's, Inf., Post.)  [x] (20324) Provided manual therapy to mobilize soft tissue/joints of cervical/CT, scapular GHJ and UE for the purpose of decreasing headache, modulating pain, promoting relaxation,  increasing ROM, reducing/eliminating soft tissue swelling/inflammation/restriction, improving soft tissue extensibility and allowing for proper ROM for normal function with self care, reaching, carrying, lifting, house/yardwork, driving/computer work    Modalities:      Charges:  Timed Code Treatment Minutes: 25   Total Treatment Minutes: 25       [] EVAL (LOW) 90087 (typically 20 minutes face-to-face)  [] EVAL (MOD) 80487 (typically 30 minutes face-to-face)  [] EVAL (HIGH) 38714 (typically 45 minutes face-to-face)  [] RE-EVAL     [x] QU(38726) x    1 [] IONTO  [] NMR (16247) x    1 [] VASO  [x] Manual (77699) x   1  [] Other:  [] TA x      [] Mech Traction (16815)  [] ES(attended) (21650)      [] ES (un) (93779):     GOALS:   Patient stated goal: Eliminate pain in neck/shoulder    [x] Progressing: [] Met: [] Not Met: [] Adjusted     Therapist goals for Patient:   Short Term Goals: To be achieved in: 2 weeks  1. Independent in HEP and progression per patient tolerance, in order to prevent re-injury. [] Progressing: [x] Met: [] Not Met: [] Adjusted   2.  Patient will have a decrease in pain to facilitate improvement in movement, function, and ADLs as indicated by Functional Deficits. [] Progressing: [x] Met: [] Not Met: [] Adjusted     Long Term Goals: To be achieved in: 4 weeks  1. Disability index score of 64 or better on FOTO Neck  to assist with reaching prior level of function. [] Progressing: [x] Met: [] Not Met: [] Adjusted  2. Patient will demonstrate increased AROM to cervical spine to WNL and without pain to allow for proper joint functioning as indicated by patients Functional Deficits. [x] Progressing: [] Met: [] Not Met: [] Adjusted  Patient will demonstrate an increase in postural awareness and control and activation of  Deep cervical stabilizers to allow for proper functional mobility as indicated by patients Functional Deficits. [] Progressing: [x] Met: [] Not Met: [] Adjusted  4. Patient will return to holding her daughter without increased symptoms or restriction. [] Progressing: [x] Met: [] Not Met: [] Adjusted  5. Patient will work full day at job without pain in neck/shoulder at end of day. (patient specific functional goal)    [] Progressing: [x] Met: [] Not Met: [] Adjusted        Overall Progression Towards Functional goals/ Treatment Progress Update:  [x] Patient is progressing as expected towards functional goals listed. [] Progression is slowed due to complexities/Impairments listed. [] Progression has been slowed due to co-morbidities. [] Plan just implemented, too soon to assess goals progression <30days   [] Goals require adjustment due to lack of progress  [] Patient is not progressing as expected and requires additional follow up with physician  [] Other    Prognosis for POC: [x] Good [] Fair  [] Poor      Patient requires continued skilled intervention: [x] Yes  [] No    Treatment/Activity Tolerance:  [x] Patient able to complete treatment  [] Patient limited by fatigue  [] Patient limited by pain     [] Patient limited by other medical complications  [] Other:  see above. PLAN: Return to PT in 1 month if needed 3/9/23  [x] Continue per plan of care [] Alter current plan (see comments above)  [] Plan of care initiated [] Hold pending MD visit [] Discharge      Electronically signed by:  Agata Darling PT, DPT, OCS    Note: If patient does not return for scheduled/ recommended follow up visits, this note will serve as a discharge from care along with most recent update on progress.

## 2023-03-28 ENCOUNTER — OFFICE VISIT (OUTPATIENT)
Dept: VASCULAR SURGERY | Age: 38
End: 2023-03-28

## 2023-03-28 VITALS — WEIGHT: 124 LBS | HEIGHT: 66 IN | BODY MASS INDEX: 19.93 KG/M2

## 2023-03-28 DIAGNOSIS — I86.8 SPIDER VARICOSE VEINS: Primary | ICD-10-CM

## 2023-03-28 PROCEDURE — 99024 POSTOP FOLLOW-UP VISIT: CPT | Performed by: SURGERY

## 2023-03-28 ASSESSMENT — ENCOUNTER SYMPTOMS
RESPIRATORY NEGATIVE: 1
ALLERGIC/IMMUNOLOGIC NEGATIVE: 1
EYES NEGATIVE: 1
GASTROINTESTINAL NEGATIVE: 1

## 2023-03-28 NOTE — PROGRESS NOTES
Daily Progress Note   Lucas Marcelo MD      3/28/2023    Chief Complaint   Patient presents with    Follow-up     Sclerotherapy         HISTORY OF PRESENT ILLNESS:                The patient is a 43 y.o. female who presents with a follow up for sclerotherapy done 1/20/23. Nigel Donaldson says she is doing well. Her legs look good with evidence of the sclerotherapy working. She recently was on a vacation in the [place] with her sister, and had no problems with her legs while she was gone.      Past Medical History:   Diagnosis Date    Alopecia     r/t eating disorder    Arthritis     SI joint    ASCUS with positive high risk HPV     Cervical high risk HPV (human papillomavirus) test positive     Closed fracture of shaft of right ulna 06/03/2019    Contact lens/glasses fitting     Contact lens/glasses fitting     Depressive disorder     history anorexia--goes to counseling/ post partum depression    Dysmenorrhea     Encounter for insertion of mirena IUD     History of basal cell carcinoma (BCC) 06/16/2021    Unequal leg length (acquired)        Past Surgical History:   Procedure Laterality Date    LEEP  2/26/2013,8/2012    hsil and hpv+    LIGATION OF SAPHENOUS VEIN Bilateral 9/14/2022    LESSER SAPHENOUS VEIN LIGATION performed by Rodriguez Wu MD at [address] Right 6/6/2019    OPEN REDUCTION INTERNAL FIXATION RIGHT ULNAR SHAFT FRACTURE WITH MINI C-ARM performed by Israel Motta MD at [address] Bilateral 9/14/2022    STAB PHLEBECTOMIES BILATERALLY, GREATER THAN 20 performed by Rodriguez Wu MD at [address] History     Socioeconomic History    Marital status: Single     Spouse name: Not on file    Number of children: Not on file    Years of education: Not on file    Highest education level: Not on file   Occupational History    Not on file   Tobacco Use    Smoking status: Every Day     Packs/day: 0.25     Years: 22.00     Pack

## 2024-06-14 ENCOUNTER — HOSPITAL ENCOUNTER (OUTPATIENT)
Dept: WOMENS IMAGING | Age: 39
Discharge: HOME | End: 2024-06-14
Payer: COMMERCIAL

## 2024-06-14 VITALS — HEIGHT: 66 IN | WEIGHT: 119 LBS | BODY MASS INDEX: 19.13 KG/M2

## 2024-06-14 DIAGNOSIS — Z12.31 SCREENING MAMMOGRAM, ENCOUNTER FOR: ICD-10-CM

## 2024-06-14 PROCEDURE — 77063 BREAST TOMOSYNTHESIS BI: CPT

## 2025-06-23 ENCOUNTER — HOSPITAL ENCOUNTER (OUTPATIENT)
Dept: WOMENS IMAGING | Age: 40
Discharge: HOME | End: 2025-06-23
Payer: COMMERCIAL

## 2025-06-23 VITALS — BODY MASS INDEX: 19.29 KG/M2 | WEIGHT: 120 LBS | HEIGHT: 66 IN

## 2025-06-23 DIAGNOSIS — Z12.31 BREAST CANCER SCREENING BY MAMMOGRAM: ICD-10-CM

## 2025-06-23 PROCEDURE — 77063 BREAST TOMOSYNTHESIS BI: CPT

## (undated) DEVICE — BIT DRL L100MM DIA2.5MM FOR SM FRAG UNIV LOK SYS

## (undated) DEVICE — SHEET, T, LAPAROTOMY, STERILE: Brand: MEDLINE

## (undated) DEVICE — SYRINGE MED 30ML STD CLR PLAS LUERLOCK TIP N CTRL DISP

## (undated) DEVICE — SPONGE GZ W4XL4IN COT 12 PLY TYP VII WVN C FLD DSGN

## (undated) DEVICE — Z DISCONTINUED USE 2275676 GLOVE SURG SZ 65 L12IN FNGR THK87MIL DK GRN LTX FREE ISOLEX

## (undated) DEVICE — SUTURE PERMAHAND SZ 2-0 L30IN NONABSORBABLE BLK SILK W/O A305H

## (undated) DEVICE — GOWN SIRUS NONREIN XL W/TWL: Brand: MEDLINE INDUSTRIES, INC.

## (undated) DEVICE — SHEET,DRAPE,53X77,STERILE: Brand: MEDLINE

## (undated) DEVICE — BANDAGE COMPR W4INXL12FT E DISP ESMARCH EVEN

## (undated) DEVICE — UNDERGLOVE SURG SZ 8 FNGR THK0.21MIL GRN LTX BEAD CUF

## (undated) DEVICE — MAJOR VASCULAR: Brand: MEDLINE INDUSTRIES, INC.

## (undated) DEVICE — PROVE COVER: Brand: UNBRANDED

## (undated) DEVICE — PADDING UNDERCAST W4INXL4YD 100% COT CRIMPED FINISH WBRL II

## (undated) DEVICE — LOOP VES W1.3MM THK0.9MM MINI WHT SIL FLD REPELLENT

## (undated) DEVICE — NEEDLE HYPO 18GA L1.5IN THN WALL PIVOTING SHLD BVL ORIENTED

## (undated) DEVICE — SUTURE PERMAHAND SZ 3-0 L18IN NONABSORBABLE BLK L26MM SH C013D

## (undated) DEVICE — BANDAGE COMPR W6INXL10YD ST M E WHITE/BEIGE

## (undated) DEVICE — SUTURE NONABSORBABLE MONOFILAMENT 5-0 C-1 1X24 IN PROLENE 8725H

## (undated) DEVICE — NEEDLE SPNL 20GA L3.5IN YEL HUB S STL REG WALL FIT STYL W/

## (undated) DEVICE — SUTURE PERMAHAND SZ 4-0 L12X30IN NONABSORBABLE BLK SILK A303H

## (undated) DEVICE — DRESSING PETRO W3XL3IN OIL EMUL N ADH GZ KNIT IMPREG CELOS

## (undated) DEVICE — GLOVE SURG SZ 85 CRM LTX FREE POLYISOPRENE POLYMER BEAD ANTI

## (undated) DEVICE — BANDAGE COMPR W4INXL15FT BGE E SGL LAYERED CLP CLSR

## (undated) DEVICE — SUTURE VCRL + SZ 4-0 L18IN ABSRB UD L19MM PS-2 3/8 CIR PRIM VCP496H

## (undated) DEVICE — CHLORAPREP 26ML ORANGE

## (undated) DEVICE — 3M™ STERI-STRIP™ COMPOUND BENZOIN TINCTURE 40 BAGS/CARTON 4 CARTONS/CASE C1544: Brand: 3M™ STERI-STRIP™

## (undated) DEVICE — GLOVE SURG SZ 65 L12IN FNGR THK87MIL WHT LTX FREE

## (undated) DEVICE — MINOR SET UP PK

## (undated) DEVICE — SUTURE VCRL + SZ 3-0 L27IN ABSRB UD L26MM SH 1/2 CIR VCP416H

## (undated) DEVICE — CLEANER,CAUTERY TIP,2X2",STERILE: Brand: MEDLINE

## (undated) DEVICE — SYRINGE IRRIG 60ML SFT PLIABLE BLB EZ TO GRP 1 HND USE W/

## (undated) DEVICE — GLOVE SURG SZ 8 CRM LTX FREE POLYISOPRENE POLYMER BEAD ANTI

## (undated) DEVICE — ZIMMER® STERILE DISPOSABLE TOURNIQUET CUFF WITH PLC, DUAL PORT, SINGLE BLADDER, 18 IN. (46 CM)

## (undated) DEVICE — GLOVE SURG SZ 8 L12IN FNGR THK94MIL STD WHT LTX SYN POLYMER

## (undated) DEVICE — SOLUTION IV 1000ML 0.9% SOD CHL PH 5 INJ USP VIAFLX PLAS

## (undated) DEVICE — CLIP LIG M TI 6 SIL HNDL FOR OPN AND ENDOSCP SGL APPL

## (undated) DEVICE — CLIP LIG SM TI 6 BLU HNDL FOR OPN AND ENDOSCP SGL APPL

## (undated) DEVICE — COVER LT HNDL BLU PLAS

## (undated) DEVICE — SYRINGE TB 1ML NDL 27GA L0.5IN PLAS W/ SFTY LOK PERM NDL

## (undated) DEVICE — DRAPE HND W114XL142IN BLU POLYPR W O PCH FEN CRD AND TB HLDR

## (undated) DEVICE — SUTURE VCRL SZ 3-0 L27IN ABSRB UD L26MM SH 1/2 CIR J416H

## (undated) DEVICE — BIT DRL L205MM DIA2.7MM STD QUIK CONN W/O STP N RADLUC

## (undated) DEVICE — GOWN,SIRUS,POLYRNF,BRTHSLV,XL,30/CS: Brand: MEDLINE

## (undated) DEVICE — BANDAGE,GAUZE,BULKEE II,4.5"X4.1YD,STRL: Brand: MEDLINE

## (undated) DEVICE — SUTURE CHROMIC GUT SZ 4-0 L27IN ABSRB BRN FS-2 L19MM 3/8 635H

## (undated) DEVICE — SOLUTION IV IRRIG POUR BRL 0.9% SODIUM CHL 2F7124

## (undated) DEVICE — GAUZE,SPONGE,4"X4",16PLY,XRAY,STRL,LF: Brand: MEDLINE

## (undated) DEVICE — SUTURE PERMA-HAND SZ 2-0 L30IN NONABSORBABLE BLK L26MM SH K833H

## (undated) DEVICE — 48" PROBE COVER W/GEL, ULTRASOUND, STERILE: Brand: SITE-RITE

## (undated) DEVICE — STRIP,CLOSURE,WOUND,MEDI-STRIP,1/2X4: Brand: MEDLINE

## (undated) DEVICE — GAUZE FLUFF 2 PLY: Brand: DEROYAL

## (undated) DEVICE — SOLUTION IV IRRIG 250ML ST LF 0.9% SODIUM 2F7122

## (undated) DEVICE — LOOP VES W25MM THK1MM MAXI RED SIL FLD REPELLENT 100 PER